# Patient Record
Sex: FEMALE | Race: BLACK OR AFRICAN AMERICAN | NOT HISPANIC OR LATINO | URBAN - METROPOLITAN AREA
[De-identification: names, ages, dates, MRNs, and addresses within clinical notes are randomized per-mention and may not be internally consistent; named-entity substitution may affect disease eponyms.]

---

## 2017-09-15 ENCOUNTER — EMERGENCY (EMERGENCY)
Facility: HOSPITAL | Age: 27
LOS: 0 days | Discharge: HOME | End: 2017-09-15
Attending: EMERGENCY MEDICINE
Payer: SELF-PAY

## 2017-09-15 VITALS
DIASTOLIC BLOOD PRESSURE: 72 MMHG | RESPIRATION RATE: 16 BRPM | HEART RATE: 92 BPM | WEIGHT: 154.98 LBS | SYSTOLIC BLOOD PRESSURE: 125 MMHG | OXYGEN SATURATION: 100 % | HEIGHT: 62 IN | TEMPERATURE: 100 F

## 2017-09-15 DIAGNOSIS — L02.91 CUTANEOUS ABSCESS, UNSPECIFIED: ICD-10-CM

## 2017-09-15 DIAGNOSIS — N75.0 CYST OF BARTHOLIN'S GLAND: ICD-10-CM

## 2017-09-15 PROCEDURE — 99283 EMERGENCY DEPT VISIT LOW MDM: CPT | Mod: 25

## 2017-09-15 PROCEDURE — 56420 I&D BARTHOLINS GLAND ABSCESS: CPT

## 2017-09-15 RX ORDER — ACETAMINOPHEN 500 MG
650 TABLET ORAL ONCE
Qty: 0 | Refills: 0 | Status: COMPLETED | OUTPATIENT
Start: 2017-09-15 | End: 2017-09-15

## 2017-09-15 RX ADMIN — Medication 300 MILLIGRAM(S): at 14:29

## 2017-09-15 RX ADMIN — Medication 650 MILLIGRAM(S): at 14:36

## 2017-09-15 NOTE — ED PROVIDER NOTE - OBJECTIVE STATEMENT
27 years old female walked in c/o painful lump to her vagina 2 to 3 days. Pt sts she has a hx of cyst to the vagina. Pt denies vaginal spotting or discharge or lesion. Pt sts she is not pregnant.

## 2019-10-09 NOTE — ED PROCEDURE NOTE - CPROC ED FINDINGS1
Detail Level: Simple Consent: The patient's consent was obtained including but not limited to risks of crusting, scabbing, blistering, scarring, darker or lighter pigmentary change, recurrence, incomplete removal and infection. Post-Care Instructions: I reviewed with the patient in detail post-care instructions. Patient is to wear sunprotection, and avoid picking at any of the treated lesions. Pt may apply Vaseline to crusted or scabbing areas. bloody fluid Duration Of Freeze Thaw-Cycle (Seconds): 5 Render Post-Care Instructions In Note?: no Number Of Freeze-Thaw Cycles: 1 freeze-thaw cycle

## 2020-07-09 ENCOUNTER — EMERGENCY (EMERGENCY)
Facility: HOSPITAL | Age: 30
LOS: 1 days | Discharge: HOME | End: 2020-07-09
Attending: EMERGENCY MEDICINE | Admitting: EMERGENCY MEDICINE
Payer: SELF-PAY

## 2020-07-09 VITALS
DIASTOLIC BLOOD PRESSURE: 83 MMHG | TEMPERATURE: 103 F | SYSTOLIC BLOOD PRESSURE: 142 MMHG | HEIGHT: 62 IN | OXYGEN SATURATION: 98 % | HEART RATE: 100 BPM | WEIGHT: 166.89 LBS | RESPIRATION RATE: 18 BRPM

## 2020-07-09 PROCEDURE — 99282 EMERGENCY DEPT VISIT SF MDM: CPT

## 2020-07-09 PROCEDURE — U0003: CPT

## 2020-07-09 PROCEDURE — 99283 EMERGENCY DEPT VISIT LOW MDM: CPT

## 2020-07-09 NOTE — ED PROVIDER NOTE - PATIENT PORTAL LINK FT
You can access the FollowMyHealth Patient Portal offered by [facility] by registering at the following website: [email or web address]. By joining [facility]’s FollowMyHealth portal, you will also be able to view your health information using other applications (apps) compatible with our system.

## 2020-07-09 NOTE — ED PROVIDER NOTE - CLINICAL SUMMARY MEDICAL DECISION MAKING FREE TEXT BOX
Pt is a 29 yo female with fever possible covid exposure looks well clinically no resp distress advised fluids rest Tylenol self quarantine  covid swab sent

## 2020-07-09 NOTE — ED PROVIDER NOTE - ATTENDING CONTRIBUTION TO CARE
29 yo F p/w fever x 1d, pts sister works in SNF and also has URI sx. No other known covid exposure. No cp/sob./palp. no weakness / dizziness. no neck / back pain. no abd pain. no n/v/d. No agg/allev factors. No other inj or co.,  exam: MM MOist. neck supple. non-toxic, well appearing. nl resp effort. no acc muscle use. no w/r/r./ lungs clear. abd soft NT. no other acute findings.  No hypoxia noted/  dw pt re covid risk, need for self iso and to return with any changes or concerns.,

## 2020-07-09 NOTE — ED PROVIDER NOTE - OBJECTIVE STATEMENT
Pt is a 31 yo female with no pmhx c/o of fever x1 day today took Tylenol pta. Pt denies any nvd cough sob abdominal pain dysuria. 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 Pt sister works at nursing home and has had covid exposures.

## 2020-07-09 NOTE — ED ADULT NURSE NOTE - OBJECTIVE STATEMENT
I got a fever today and took tylenol prior to arrivial. Pt received sitting on chair in NAD. Pt AOx3 C/o fever Neuro WNL. PERRLA. Lungs CTA, RR even unlabored. Ab soft non tender, + bowel sounds x 4quads. Denies Nausea, Vomiting, Diarrhea cough chest pain or SOB Skin warm, dry, color appropriate for age and race.

## 2020-07-09 NOTE — ED ADULT NURSE NOTE - NSIMPLEMENTINTERV_GEN_ALL_ED
Implemented All Universal Safety Interventions:  [place] to call system. Call bell, personal items and telephone within reach. Instruct patient to call for assistance. Room bathroom lighting operational. Non-slip footwear when patient is off stretcher. Physically safe environment: no spills, clutter or unnecessary equipment. Stretcher in lowest position, wheels locked, appropriate side rails in place.

## 2020-07-09 NOTE — ED PROVIDER NOTE - NSFOLLOWUPINSTRUCTIONS_ED_ALL_ED_FT
You have been tested today for COVID 19.  Currently you do not meet criteria for inpatient admission.  You are being sent home at this time for home isolation.  It is currently recommended at this time that you isolate for the next 14 days unless further instructions are provided at a later date.  When home on home isolation please try to use your own bathroom and bedroom   Continue Tylenol per label instructions as needed for fever and body aches  DO NOT TAKE IBUPROFEN AT THIS TIME  Salt water rinse as needed for sore throat  Advance activity as tolerated  Please return to the ED for increased difficulty breathing or signs of respiratory distress  Your will be contacted with your results at a later time

## 2020-07-10 LAB — SARS-COV-2 RNA SPEC QL NAA+PROBE: DETECTED

## 2020-07-13 PROBLEM — N75.0 CYST OF BARTHOLIN'S GLAND: Chronic | Status: ACTIVE | Noted: 2017-09-15

## 2022-04-03 PROBLEM — Z00.00 ENCOUNTER FOR PREVENTIVE HEALTH EXAMINATION: Status: ACTIVE | Noted: 2022-04-03

## 2022-04-11 ENCOUNTER — APPOINTMENT (OUTPATIENT)
Dept: OBGYN | Facility: CLINIC | Age: 32
End: 2022-04-11
Payer: COMMERCIAL

## 2022-04-11 ENCOUNTER — LABORATORY RESULT (OUTPATIENT)
Age: 32
End: 2022-04-11

## 2022-04-11 VITALS
BODY MASS INDEX: 26.5 KG/M2 | HEIGHT: 62 IN | SYSTOLIC BLOOD PRESSURE: 138 MMHG | DIASTOLIC BLOOD PRESSURE: 90 MMHG | WEIGHT: 144 LBS

## 2022-04-11 DIAGNOSIS — Z11.3 ENCOUNTER FOR SCREENING FOR INFECTIONS WITH A PREDOMINANTLY SEXUAL MODE OF TRANSMISSION: ICD-10-CM

## 2022-04-11 DIAGNOSIS — Z87.898 PERSONAL HISTORY OF OTHER SPECIFIED CONDITIONS: ICD-10-CM

## 2022-04-11 DIAGNOSIS — Z78.9 OTHER SPECIFIED HEALTH STATUS: ICD-10-CM

## 2022-04-11 LAB
HBV SURFACE AG SER QL: NONREACTIVE
HIV1+2 AB SPEC QL IA.RAPID: NONREACTIVE

## 2022-04-11 PROCEDURE — 76817 TRANSVAGINAL US OBSTETRIC: CPT

## 2022-04-11 PROCEDURE — 99385 PREV VISIT NEW AGE 18-39: CPT

## 2022-04-11 NOTE — PROCEDURE
[Transvaginal OB Sonogram] : Transvaginal OB Sonogram [Transabdominal OB Sonogram] : Transabdominal OB Sonogram [Intrauterine Pregnancy] : intrauterine pregnancy [Yolk Sac] : yolk sac present [Fetal Heart] : fetal heart present [CRL: ___ (mm)] : CRL - [unfilled]Umm [Current GA by Sonogram: ___ (wks)] : Current GA by Sonogram: [unfilled]Uwks [___ day(s)] : [unfilled] days [Fibroid Uterus] : fibroid uterus [FreeTextEntry3] : 5.21cm x6.54cm anterior fibroid noted\par small 3.56cm RONNELL fibroid noted

## 2022-04-11 NOTE — PHYSICAL EXAM
[Appropriately responsive] : appropriately responsive [Alert] : alert [No Acute Distress] : no acute distress [Soft] : soft [Non-tender] : non-tender [Non-distended] : non-distended [Oriented x3] : oriented x3 [FreeTextEntry7] : 20 week sized, fibroids palpated [Examination Of The Breasts] : a normal appearance [No Masses] : no breast masses were palpable [Labia Majora] : normal [Labia Minora] : normal [Normal] : normal [Enlarged ___ wks] : enlarged [unfilled] ~Uweeks [Uterine Adnexae] : normal

## 2022-04-11 NOTE — PLAN
[FreeTextEntry1] : 31 y/o  LMP  presents for annual exam \par \par #HCM\par -f/u pap/hpv\par -f/u STI testing\par \par #amenorrhea\par -undesired pregnancy \par -will scheduled for TOP consult

## 2022-04-11 NOTE — HISTORY OF PRESENT ILLNESS
[Patient reported PAP Smear was normal] : Patient reported PAP Smear was normal [Normal Amount/Duration] :  normal amount and duration [Regular Cycle Intervals] : periods have been regular [Currently Active] : currently active [Men] : men [Vaginal] : vaginal [No] : No [Condoms] : Condoms [Patient would like to be screened for STIs] : Patient would like to be screened for STIs [PapSmeardate] : 2017 [FreeTextEntry1] : 2/19/22

## 2022-04-12 LAB
ABO + RH PNL BLD: NORMAL
BASOPHILS # BLD AUTO: 0.02 K/UL
BASOPHILS NFR BLD AUTO: 0.3 %
BLD GP AB SCN SERPL QL: NORMAL
C TRACH RRNA SPEC QL NAA+PROBE: NOT DETECTED
EOSINOPHIL # BLD AUTO: 0.03 K/UL
EOSINOPHIL NFR BLD AUTO: 0.5 %
HCT VFR BLD CALC: 27.1 %
HCV RNA SERPL NAA+PROBE-LOG IU: NOT DETECTED LOGIU/ML
HEPC RNA INTERP: NOT DETECTED
HGB BLD-MCNC: 6.7 G/DL
HPV HIGH+LOW RISK DNA PNL CVX: NOT DETECTED
IMM GRANULOCYTES NFR BLD AUTO: 0.2 %
LYMPHOCYTES # BLD AUTO: 2.11 K/UL
LYMPHOCYTES NFR BLD AUTO: 35.7 %
MAN DIFF?: NORMAL
MCHC RBC-ENTMCNC: 15.7 PG
MCHC RBC-ENTMCNC: 24.7 GM/DL
MCV RBC AUTO: 63.3 FL
MONOCYTES # BLD AUTO: 0.54 K/UL
MONOCYTES NFR BLD AUTO: 9.1 %
N GONORRHOEA RRNA SPEC QL NAA+PROBE: NOT DETECTED
NEUTROPHILS # BLD AUTO: 3.2 K/UL
NEUTROPHILS NFR BLD AUTO: 54.2 %
PLATELET # BLD AUTO: 273 K/UL
RBC # BLD: 4.28 M/UL
RBC # FLD: 23.9 %
SOURCE AMPLIFICATION: NORMAL
T PALLIDUM AB SER QL IA: NEGATIVE
WBC # FLD AUTO: 5.91 K/UL

## 2022-04-13 ENCOUNTER — APPOINTMENT (OUTPATIENT)
Dept: OBGYN | Facility: CLINIC | Age: 32
End: 2022-04-13

## 2022-04-14 ENCOUNTER — APPOINTMENT (OUTPATIENT)
Dept: OBGYN | Facility: CLINIC | Age: 32
End: 2022-04-14

## 2022-04-15 LAB — CYTOLOGY CVX/VAG DOC THIN PREP: NORMAL

## 2022-04-21 ENCOUNTER — TRANSCRIPTION ENCOUNTER (OUTPATIENT)
Age: 32
End: 2022-04-21

## 2022-04-21 ENCOUNTER — APPOINTMENT (OUTPATIENT)
Dept: OBGYN | Facility: CLINIC | Age: 32
End: 2022-04-21
Payer: COMMERCIAL

## 2022-04-21 PROCEDURE — 99214 OFFICE O/P EST MOD 30 MIN: CPT | Mod: 95

## 2022-04-21 NOTE — HISTORY OF PRESENT ILLNESS
[FreeTextEntry1] : This visit was provided via Telehealth using real-time 2-way audio visual technology. The patient THEA HALL was located at home [address] [place] [address] at the time of the visit. The provider Violet Mckenzie was located at the medical office located in [place], [place] at the time of the visit. The patient THEA HALL and provider participated in the Telehealth encounter. Verbal consent for Telehealth services was given on 2022 by the patient THEA HALL.\par \par \par 33 y/o  @8w5d (LMP 22) presents for consultation for termination of pregnancy due to unplanned, undesired pregnancy.\par \par Pt was not using any birth control when this happened. \par \par [facility] Pt was seen by Dr. Jett and cleared from hematology standpoint. 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

## 2022-04-25 ENCOUNTER — OUTPATIENT (OUTPATIENT)
Dept: OUTPATIENT SERVICES | Facility: HOSPITAL | Age: 32
LOS: 1 days | End: 2022-04-25
Payer: COMMERCIAL

## 2022-04-25 ENCOUNTER — TRANSCRIPTION ENCOUNTER (OUTPATIENT)
Age: 32
End: 2022-04-25

## 2022-04-25 VITALS
SYSTOLIC BLOOD PRESSURE: 117 MMHG | OXYGEN SATURATION: 100 % | TEMPERATURE: 99 F | RESPIRATION RATE: 18 BRPM | WEIGHT: 147.93 LBS | HEART RATE: 93 BPM | HEIGHT: 62 IN | DIASTOLIC BLOOD PRESSURE: 72 MMHG

## 2022-04-25 DIAGNOSIS — Z34.90 ENCOUNTER FOR SUPERVISION OF NORMAL PREGNANCY, UNSPECIFIED, UNSPECIFIED TRIMESTER: ICD-10-CM

## 2022-04-25 DIAGNOSIS — Z01.818 ENCOUNTER FOR OTHER PREPROCEDURAL EXAMINATION: ICD-10-CM

## 2022-04-25 DIAGNOSIS — Z33.2 ENCOUNTER FOR ELECTIVE TERMINATION OF PREGNANCY: ICD-10-CM

## 2022-04-25 DIAGNOSIS — Z98.890 OTHER SPECIFIED POSTPROCEDURAL STATES: Chronic | ICD-10-CM

## 2022-04-25 DIAGNOSIS — Z11.52 ENCOUNTER FOR SCREENING FOR COVID-19: ICD-10-CM

## 2022-04-25 LAB
BLD GP AB SCN SERPL QL: NEGATIVE — SIGNIFICANT CHANGE UP
HCT VFR BLD CALC: 28.7 % — LOW (ref 34.5–45)
HGB BLD-MCNC: 7.2 G/DL — LOW (ref 11.5–15.5)
MCHC RBC-ENTMCNC: 17.4 PG — LOW (ref 27–34)
MCHC RBC-ENTMCNC: 25.1 GM/DL — LOW (ref 32–36)
MCV RBC AUTO: 69.3 FL — LOW (ref 80–100)
NRBC # BLD: 0 /100 WBCS — SIGNIFICANT CHANGE UP (ref 0–0)
PLATELET # BLD AUTO: 563 K/UL — HIGH (ref 150–400)
RBC # BLD: 4.14 M/UL — SIGNIFICANT CHANGE UP (ref 3.8–5.2)
RBC # FLD: 29.3 % — HIGH (ref 10.3–14.5)
RH IG SCN BLD-IMP: POSITIVE — SIGNIFICANT CHANGE UP
SARS-COV-2 RNA SPEC QL NAA+PROBE: SIGNIFICANT CHANGE UP
WBC # BLD: 7.22 K/UL — SIGNIFICANT CHANGE UP (ref 3.8–10.5)
WBC # FLD AUTO: 7.22 K/UL — SIGNIFICANT CHANGE UP (ref 3.8–10.5)

## 2022-04-25 PROCEDURE — 86901 BLOOD TYPING SEROLOGIC RH(D): CPT

## 2022-04-25 PROCEDURE — 86850 RBC ANTIBODY SCREEN: CPT

## 2022-04-25 PROCEDURE — G0463: CPT

## 2022-04-25 PROCEDURE — 85027 COMPLETE CBC AUTOMATED: CPT

## 2022-04-25 PROCEDURE — 86900 BLOOD TYPING SEROLOGIC ABO: CPT

## 2022-04-25 PROCEDURE — C9803: CPT

## 2022-04-25 PROCEDURE — U0005: CPT

## 2022-04-25 PROCEDURE — U0003: CPT

## 2022-04-25 RX ORDER — SODIUM CHLORIDE 9 MG/ML
3 INJECTION INTRAMUSCULAR; INTRAVENOUS; SUBCUTANEOUS EVERY 8 HOURS
Refills: 0 | Status: DISCONTINUED | OUTPATIENT
Start: 2022-04-26 | End: 2022-05-10

## 2022-04-25 RX ORDER — SODIUM CHLORIDE 9 MG/ML
1000 INJECTION, SOLUTION INTRAVENOUS
Refills: 0 | Status: DISCONTINUED | OUTPATIENT
Start: 2022-04-26 | End: 2022-05-10

## 2022-04-25 NOTE — H&P PST ADULT - FALL HARM RISK - UNIVERSAL INTERVENTIONS
Bed in lowest position, wheels locked, appropriate side rails in place/Call bell, personal items and telephone in reach/Instruct patient to call for assistance before getting out of bed or chair/Non-slip footwear when patient is out of bed/[place] to call system/Physically safe environment - no spills, clutter or unnecessary equipment/Purposeful Proactive Rounding/Room/bathroom lighting operational, light cord in reach

## 2022-04-25 NOTE — H&P PST ADULT - HISTORY OF PRESENT ILLNESS
This is a 33 y/o female PMH iron deficiency anemia, S/P ferritin a week ago,  3 para 1, S/P D+C for IUP X 1, now approximately 9 weeks gestation.  Presents today for D+C for IUP.    COVID+ 3/2021, was asymptomatic.  COVID swab 22 at [facility] This is a 31 y/o female PMH iron deficiency anemia, S/P ferritin infusion a week ago,  3 para 1, S/P D+C for IUP X 1, now approximately 9 weeks gestation.  Presents today for D+C for IUP.    COVID+ 3/2021, was asymptomatic.  COVID swab 22 at [facility]

## 2022-04-26 ENCOUNTER — OUTPATIENT (OUTPATIENT)
Dept: OUTPATIENT SERVICES | Facility: HOSPITAL | Age: 32
LOS: 1 days | End: 2022-04-26
Payer: COMMERCIAL

## 2022-04-26 ENCOUNTER — TRANSCRIPTION ENCOUNTER (OUTPATIENT)
Age: 32
End: 2022-04-26

## 2022-04-26 ENCOUNTER — APPOINTMENT (OUTPATIENT)
Dept: OBGYN | Facility: CLINIC | Age: 32
End: 2022-04-26

## 2022-04-26 ENCOUNTER — RESULT REVIEW (OUTPATIENT)
Age: 32
End: 2022-04-26

## 2022-04-26 VITALS
HEART RATE: 63 BPM | SYSTOLIC BLOOD PRESSURE: 101 MMHG | RESPIRATION RATE: 17 BRPM | TEMPERATURE: 97 F | DIASTOLIC BLOOD PRESSURE: 60 MMHG | OXYGEN SATURATION: 100 %

## 2022-04-26 VITALS
RESPIRATION RATE: 16 BRPM | HEIGHT: 62 IN | OXYGEN SATURATION: 100 % | HEART RATE: 63 BPM | WEIGHT: 147.93 LBS | SYSTOLIC BLOOD PRESSURE: 103 MMHG | TEMPERATURE: 97 F | DIASTOLIC BLOOD PRESSURE: 66 MMHG

## 2022-04-26 DIAGNOSIS — Z98.890 OTHER SPECIFIED POSTPROCEDURAL STATES: Chronic | ICD-10-CM

## 2022-04-26 DIAGNOSIS — Z33.2 ENCOUNTER FOR ELECTIVE TERMINATION OF PREGNANCY: ICD-10-CM

## 2022-04-26 LAB
HCT VFR BLD CALC: 28.7 % — LOW (ref 34.5–45)
HGB BLD-MCNC: 7.5 G/DL — LOW (ref 11.5–15.5)
MCHC RBC-ENTMCNC: 17.4 PG — LOW (ref 27–34)
MCHC RBC-ENTMCNC: 26.1 GM/DL — LOW (ref 32–36)
MCV RBC AUTO: 66.4 FL — LOW (ref 80–100)
NRBC # BLD: 0 /100 WBCS — SIGNIFICANT CHANGE UP (ref 0–0)
PLATELET # BLD AUTO: 547 K/UL — HIGH (ref 150–400)
RBC # BLD: 4.32 M/UL — SIGNIFICANT CHANGE UP (ref 3.8–5.2)
RBC # FLD: 29.1 % — HIGH (ref 10.3–14.5)
RH IG SCN BLD-IMP: POSITIVE — SIGNIFICANT CHANGE UP
WBC # BLD: 7.3 K/UL — SIGNIFICANT CHANGE UP (ref 3.8–10.5)
WBC # FLD AUTO: 7.3 K/UL — SIGNIFICANT CHANGE UP (ref 3.8–10.5)

## 2022-04-26 PROCEDURE — 88305 TISSUE EXAM BY PATHOLOGIST: CPT | Mod: 26

## 2022-04-26 PROCEDURE — 76998 US GUIDE INTRAOP: CPT | Mod: 26

## 2022-04-26 PROCEDURE — 36415 COLL VENOUS BLD VENIPUNCTURE: CPT

## 2022-04-26 PROCEDURE — 85027 COMPLETE CBC AUTOMATED: CPT

## 2022-04-26 PROCEDURE — 59812 TREATMENT OF MISCARRIAGE: CPT

## 2022-04-26 PROCEDURE — 88305 TISSUE EXAM BY PATHOLOGIST: CPT

## 2022-04-26 PROCEDURE — 59840 INDUCED ABORTION D&C: CPT

## 2022-04-26 RX ORDER — ONDANSETRON 8 MG/1
4 TABLET, FILM COATED ORAL ONCE
Refills: 0 | Status: DISCONTINUED | OUTPATIENT
Start: 2022-04-26 | End: 2022-05-10

## 2022-04-26 RX ORDER — OXYCODONE HYDROCHLORIDE 5 MG/1
5 TABLET ORAL ONCE
Refills: 0 | Status: DISCONTINUED | OUTPATIENT
Start: 2022-04-26 | End: 2022-04-26

## 2022-04-26 RX ORDER — FENTANYL CITRATE 50 UG/ML
25 INJECTION INTRAVENOUS
Refills: 0 | Status: DISCONTINUED | OUTPATIENT
Start: 2022-04-26 | End: 2022-04-26

## 2022-04-26 RX ORDER — LIDOCAINE HCL 20 MG/ML
0.2 VIAL (ML) INJECTION ONCE
Refills: 0 | Status: COMPLETED | OUTPATIENT
Start: 2022-04-26 | End: 2022-04-26

## 2022-04-26 RX ADMIN — SODIUM CHLORIDE 100 MILLILITER(S): 9 INJECTION, SOLUTION INTRAVENOUS at 13:25

## 2022-04-26 NOTE — ASU DISCHARGE PLAN (ADULT/PEDIATRIC) - NURSING INSTRUCTIONS
Next dose of Tylenol will be on or after ______8:30 PM_____ ,today/tonight and every 6 hours afterwards as needed for pain management, do not take any Tylenol containing products until this time. Your first dose of Tylenol was given at ___2:30___PM_____. Do not exceed more than 4000mg of Tylenol in one 24 hour setting. If no contraindications, you may alternate with Ibuprofen  3 hours after dose of Tylenol. Ibuprofen can be taken every 6 hours, Last dose received at 2:53 PM.

## 2022-04-26 NOTE — BRIEF OPERATIVE NOTE - OPERATION/FINDINGS
Anteverted uterus, 9 week sized  Gestation sac appropriate for gestational age  Thin endometrial stripe noted at the end of the procedure Anteverted uterus, 9 week sized  D&C performed under direct ultrasound guidance  Gestation sac appropriate for gestational age  Thin endometrial stripe noted at the end of the procedure

## 2022-04-26 NOTE — ASU DISCHARGE PLAN (ADULT/PEDIATRIC) - CARE PROVIDER_API CALL
Violet Mckenzie)  OBSGYN  General  [address], [address]  [place] [address]  Phone: [phone]  Fax: [phone]  Follow Up Time: 2 weeks

## 2022-04-26 NOTE — ASU DISCHARGE PLAN (ADULT/PEDIATRIC) - CALL YOUR DOCTOR IF YOU HAVE ANY OF THE FOLLOWING:
Bleeding that does not stop/Pain not relieved by Medications/Fever greater than (need to indicate Fahrenheit or Celsius) Bleeding that does not stop/Swelling that gets worse/Pain not relieved by Medications/Fever greater than (need to indicate Fahrenheit or Celsius)/Nausea and vomiting that does not stop/Unable to urinate/Inability to tolerate liquids or foods

## 2022-04-26 NOTE — PRE-ANESTHESIA EVALUATION ADULT - HEART RATE (BEATS/MIN)
63 Hatchet Flap Text: The defect edges were debeveled with a #15 scalpel blade.  Given the location of the defect, shape of the defect and the proximity to free margins a hatchet flap was deemed most appropriate.  Using a sterile surgical marker, an appropriate hatchet flap was drawn incorporating the defect and placing the expected incisions within the relaxed skin tension lines where possible.    The area thus outlined was incised deep to adipose tissue with a #15 scalpel blade.  The skin margins were undermined to an appropriate distance in all directions utilizing iris scissors.

## 2022-04-26 NOTE — ASU DISCHARGE PLAN (ADULT/PEDIATRIC) - NS MD DC FALL RISK RISK
For information on Fall & Injury Prevention, visit: [email or web address] OR  [email or web address] OR  https://www.cdc.gov/steadi/patient.html

## 2022-04-26 NOTE — ASU PATIENT PROFILE, ADULT - FALL HARM RISK - UNIVERSAL INTERVENTIONS
Bed in lowest position, wheels locked, appropriate side rails in place/Call bell, personal items and telephone in reach/Instruct patient to call for assistance before getting out of bed or chair/Non-slip footwear when patient is out of bed/[place] to call system/Physically safe environment - no spills, clutter or unnecessary equipment/Purposeful Proactive Rounding/Room/bathroom lighting operational, light cord in reach

## 2022-04-26 NOTE — ASU PATIENT PROFILE, ADULT - NS PRO LAST MENSTRUAL
Silver Nitrate Text: The wound bed was treated with silver nitrate after the biopsy was performed. 2/19/09

## 2022-04-26 NOTE — BRIEF OPERATIVE NOTE - NSICDXBRIEFPROCEDURE_GEN_ALL_CORE_FT
PROCEDURES:  Dilation and curettage, uterus, using suction, with US guidance 26-Apr-2022 15:27:29  Cruz James

## 2022-04-28 ENCOUNTER — NON-APPOINTMENT (OUTPATIENT)
Age: 32
End: 2022-04-28

## 2022-05-02 LAB — SURGICAL PATHOLOGY STUDY: SIGNIFICANT CHANGE UP

## 2022-05-12 PROBLEM — Z86.39 PERSONAL HISTORY OF OTHER ENDOCRINE, NUTRITIONAL AND METABOLIC DISEASE: Chronic | Status: ACTIVE | Noted: 2022-04-25

## 2022-05-25 ENCOUNTER — APPOINTMENT (OUTPATIENT)
Dept: OBGYN | Facility: CLINIC | Age: 32
End: 2022-05-25
Payer: COMMERCIAL

## 2022-05-25 PROCEDURE — 99213 OFFICE O/P EST LOW 20 MIN: CPT | Mod: 95
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
## 2022-05-25 NOTE — HISTORY OF PRESENT ILLNESS
[FreeTextEntry1] : This visit was provided via Telehealth using real-time 2-way audio visual technology. The patient THEA HALL was located at home [address] [place] [id number] at the time of the visit. The provider Violet Mckenzie was located at the medical office located in [place], [place] at the time of the visit. The patient THEA HALL and provider participated in the Telehealth encounter. Verbal consent for Telehealth services was given on May 25, 2022 by the patient THEA HALL.\par \par 31 y/o  s/p D&C @9w6d on 22 due to unplanned, undesired pregnancy presents for follow up. Minimal vaginal bleeding and cramping. Tolerating po, voiding, ambulating.

## 2023-12-15 ENCOUNTER — LABORATORY RESULT (OUTPATIENT)
Age: 33
End: 2023-12-15

## 2023-12-15 DIAGNOSIS — Z01.818 ENCOUNTER FOR OTHER PREPROCEDURAL EXAMINATION: ICD-10-CM

## 2023-12-16 ENCOUNTER — APPOINTMENT (OUTPATIENT)
Dept: OBGYN | Facility: CLINIC | Age: 33
End: 2023-12-16
Payer: MEDICAID

## 2023-12-16 VITALS
BODY MASS INDEX: 29.44 KG/M2 | WEIGHT: 160 LBS | DIASTOLIC BLOOD PRESSURE: 75 MMHG | HEIGHT: 62 IN | SYSTOLIC BLOOD PRESSURE: 113 MMHG

## 2023-12-16 DIAGNOSIS — Z82.49 FAMILY HISTORY OF ISCHEMIC HEART DISEASE AND OTHER DISEASES OF THE CIRCULATORY SYSTEM: ICD-10-CM

## 2023-12-16 DIAGNOSIS — Z86.2 PERSONAL HISTORY OF DISEASES OF THE BLOOD AND BLOOD-FORMING ORGANS AND CERTAIN DISORDERS INVOLVING THE IMMUNE MECHANISM: ICD-10-CM

## 2023-12-16 PROCEDURE — 99214 OFFICE O/P EST MOD 30 MIN: CPT | Mod: 25

## 2023-12-16 PROCEDURE — 76815 OB US LIMITED FETUS(S): CPT

## 2023-12-16 NOTE — PLAN
[FreeTextEntry1] : 32 y/o  @9wks (LMP 10/14/23)  requesting D&C due to unplanned, undesired pregnancy.  1. Dilation and Curettage - All available medical records reviewed - All consents signed today, all questions/concerns addressed - pt is not a candidate for medical  - reviewed patients responsibility to contact insurance company for coverage confirmation - Patient offered pamphlet for support services: patient accepted - Genetics: n/a  2. Surgery scheduling - Patient to be precertified for D+C - D+C scheduled for  main OR. CBC after consult came back and H/H is 7.1. Will have patient obtain heme clearance and will schedule patient for main OR - PSTs, COVID testing scheduled and reviewed  3. ID/Neuro/cervical prep - GC/CT obtained - miso 400mcg 3 hours prior to surgery - pt declines HIV/RPR/hepatitis testing - doxycycline 200 mg IV in OR - Reviewed Ibuprofen 600 mg po q 6 prn  4. Labs/Blood type - CBC, T+S, coags at PSTs - Rhogam pending results  5. Contraception - Patient counseled on all contraceptive options - Patient desires post  contraception -considering IUD vs OCPs, will let us know day of surgery  6. Post-op - Post-operative follow-up phone call virtual visit to be scheduled in 2 weeks - pre- and Post-operative instruction sheet given, reviewed bleeding and infection precautions - Provided 24 hour contact phone number - All questions/concerns of patient addressed to their satisfaction

## 2023-12-16 NOTE — COUNSELING
[TextEntry] : Options for the pregnancy were discussed with the patient, including continuation of pregnancy, and dilation and curettage (D&C) in the office under local anesthesia or in the operating room under sedation. Given the pregnancy is undesired, the patient would prefer not to continue the pregnancy. They do not desire to continue the pregnancy and are requesting a D&C in the operating room under sedation.  They do not desire a medical .  Risks of D&C includin.	Infection: Patient was counseled on risk of infection and the use of prophylactic antibiotics, signs/symptoms of pre- and post-operative infection were reviewed.  2.	Hemorrhage: Patient was counseled on the risk of hemorrhage, possibly requiring blood (and/or blood products) transfusion, management including use of uterotonic medications, possible use of uterine balloon tamponade, possible use of interventional radiology uterine artery embolization, and possible hysterectomy.  If patient has had prior surgeries, their risk of injury to abdominopelvic organs associated with hysterectomy increases. 3.	Trauma: Patient was counseled on the risk of trauma to vagina, cervix or uterus, possibly requiring laparoscopy, laparotomy, abdomino-pelvic organ repair and/or removal of affected organ(s).  This includes possible hysterectomy and was reviewed in detail.  The patient was counseled on the small risk of uterine perforation and the risk of injury to the vagina, cervix, uterus, rectum, bowel (small and large intestine), bladder, ureters, pelvic nerves and blood vessels.    The patient was also counseled on the small risk of the need for further surgery and of the risk, as with any surgical procedure, of death.  The risk of harm to subsequent pregnancies or the ability to carry a subsequent fetus to term, and adverse psychological effects was discussed.    Need for cervical ripening with misoprostol was also discussed; the accompanying risks of infection, bleeding were discussed.  They understand these risks and agree to above.  The patient does have  underlying medical conditions that would increase their risks associated with the procedure. These conditions are hx of anemia, fibroids and 1 prior c/s .The additional risks of the procedure are hemorrhage and trauma. The patient was also counseled that there is a chance that we may be unable to perform the procedure due to her anatomy. In that case we would have to perform the procedure laparoscopically.  The patient also understands it is their responsibility to bring to the attention of their physician any unusual symptoms following the procedure and to report to follow-up examinations.    They are sure of their decision and deny any coercion from family, friends or healthcare providers. The patient had the opportunity to ask questions and all questions were answered.

## 2023-12-16 NOTE — HISTORY OF PRESENT ILLNESS
[FreeTextEntry1] : 32 y/o  @9wks (LMP 10/14/23) presents for consultation for termination of pregnancy due to unplanned, undesired pregnancy.   Partner is aware and supportive of her decision. Pt was not using any birth control when this happened. Has not followed up with us since her last procedure, which was complicated by anemia. At the time of this consult, the cbc was not back yet for this patient.  POB:  2007: [facility]: C/S at 42 wks, macrosomia  2022: D&C @9wks, main OR due to anemia  PGYN: fibroids

## 2023-12-16 NOTE — PROCEDURE
[Transvaginal OB Sonogram] : Transvaginal OB Sonogram [Transabdominal OB Sonogram] : Transabdominal OB Sonogram [Intrauterine Pregnancy] : intrauterine pregnancy [Yolk Sac] : yolk sac present [Fetal Heart] : fetal heart present [CRL: ___ (mm)] : CRL - [unfilled]Umm [Current GA by Sonogram: ___ (wks)] : Current GA by Sonogram: [unfilled]Uwks [___ day(s)] : [unfilled] days [FreeTextEntry1] : Large posterior fibroid 6.2x6.92cm

## 2023-12-18 ENCOUNTER — OUTPATIENT (OUTPATIENT)
Dept: OUTPATIENT SERVICES | Facility: HOSPITAL | Age: 33
LOS: 1 days | End: 2023-12-18
Payer: MEDICAID

## 2023-12-18 VITALS
WEIGHT: 158.07 LBS | TEMPERATURE: 98 F | RESPIRATION RATE: 14 BRPM | DIASTOLIC BLOOD PRESSURE: 72 MMHG | OXYGEN SATURATION: 100 % | HEIGHT: 63 IN | HEART RATE: 81 BPM | SYSTOLIC BLOOD PRESSURE: 114 MMHG

## 2023-12-18 DIAGNOSIS — Z98.891 HISTORY OF UTERINE SCAR FROM PREVIOUS SURGERY: Chronic | ICD-10-CM

## 2023-12-18 DIAGNOSIS — Z33.2 ENCOUNTER FOR ELECTIVE TERMINATION OF PREGNANCY: ICD-10-CM

## 2023-12-18 DIAGNOSIS — Z01.818 ENCOUNTER FOR OTHER PREPROCEDURAL EXAMINATION: ICD-10-CM

## 2023-12-18 DIAGNOSIS — D21.9 BENIGN NEOPLASM OF CONNECTIVE AND OTHER SOFT TISSUE, UNSPECIFIED: Chronic | ICD-10-CM

## 2023-12-18 DIAGNOSIS — Z98.890 OTHER SPECIFIED POSTPROCEDURAL STATES: Chronic | ICD-10-CM

## 2023-12-18 DIAGNOSIS — D64.9 ANEMIA, UNSPECIFIED: Chronic | ICD-10-CM

## 2023-12-18 LAB
ABO + RH PNL BLD: NORMAL
BASOPHILS # BLD AUTO: 0.02 K/UL
BASOPHILS NFR BLD AUTO: 0.3 %
BLD GP AB SCN SERPL QL: NEGATIVE — SIGNIFICANT CHANGE UP
BLD GP AB SCN SERPL QL: NEGATIVE — SIGNIFICANT CHANGE UP
C TRACH RRNA SPEC QL NAA+PROBE: NOT DETECTED
EOSINOPHIL # BLD AUTO: 0.03 K/UL
EOSINOPHIL NFR BLD AUTO: 0.5 %
HCT VFR BLD CALC: 27.3 %
HCT VFR BLD CALC: 28.5 % — LOW (ref 34.5–45)
HCT VFR BLD CALC: 28.5 % — LOW (ref 34.5–45)
HGB BLD-MCNC: 7.1 G/DL
HGB BLD-MCNC: 7.3 G/DL — LOW (ref 11.5–15.5)
HGB BLD-MCNC: 7.3 G/DL — LOW (ref 11.5–15.5)
IMM GRANULOCYTES NFR BLD AUTO: 0.3 %
LYMPHOCYTES # BLD AUTO: 1.72 K/UL
LYMPHOCYTES NFR BLD AUTO: 27 %
MAN DIFF?: NORMAL
MCHC RBC-ENTMCNC: 16.2 PG — LOW (ref 27–34)
MCHC RBC-ENTMCNC: 16.2 PG — LOW (ref 27–34)
MCHC RBC-ENTMCNC: 16.4 PG
MCHC RBC-ENTMCNC: 25.6 GM/DL — LOW (ref 32–36)
MCHC RBC-ENTMCNC: 25.6 GM/DL — LOW (ref 32–36)
MCHC RBC-ENTMCNC: 26 GM/DL
MCV RBC AUTO: 62.9 FL
MCV RBC AUTO: 63.2 FL — LOW (ref 80–100)
MCV RBC AUTO: 63.2 FL — LOW (ref 80–100)
MONOCYTES # BLD AUTO: 0.72 K/UL
MONOCYTES NFR BLD AUTO: 11.3 %
N GONORRHOEA RRNA SPEC QL NAA+PROBE: NOT DETECTED
NEUTROPHILS # BLD AUTO: 3.85 K/UL
NEUTROPHILS NFR BLD AUTO: 60.6 %
NRBC # BLD: 0 /100 WBCS — SIGNIFICANT CHANGE UP (ref 0–0)
NRBC # BLD: 0 /100 WBCS — SIGNIFICANT CHANGE UP (ref 0–0)
PLATELET # BLD AUTO: 436 K/UL
PLATELET # BLD AUTO: 484 K/UL — HIGH (ref 150–400)
PLATELET # BLD AUTO: 484 K/UL — HIGH (ref 150–400)
RBC # BLD: 4.34 M/UL
RBC # BLD: 4.51 M/UL — SIGNIFICANT CHANGE UP (ref 3.8–5.2)
RBC # BLD: 4.51 M/UL — SIGNIFICANT CHANGE UP (ref 3.8–5.2)
RBC # FLD: 21.2 %
RBC # FLD: 21.3 % — HIGH (ref 10.3–14.5)
RBC # FLD: 21.3 % — HIGH (ref 10.3–14.5)
RH IG SCN BLD-IMP: POSITIVE — SIGNIFICANT CHANGE UP
RH IG SCN BLD-IMP: POSITIVE — SIGNIFICANT CHANGE UP
SOURCE AMPLIFICATION: NORMAL
WBC # BLD: 6.01 K/UL — SIGNIFICANT CHANGE UP (ref 3.8–10.5)
WBC # BLD: 6.01 K/UL — SIGNIFICANT CHANGE UP (ref 3.8–10.5)
WBC # FLD AUTO: 6.01 K/UL — SIGNIFICANT CHANGE UP (ref 3.8–10.5)
WBC # FLD AUTO: 6.01 K/UL — SIGNIFICANT CHANGE UP (ref 3.8–10.5)
WBC # FLD AUTO: 6.36 K/UL

## 2023-12-18 PROCEDURE — 86900 BLOOD TYPING SEROLOGIC ABO: CPT

## 2023-12-18 PROCEDURE — 85027 COMPLETE CBC AUTOMATED: CPT

## 2023-12-18 PROCEDURE — 86901 BLOOD TYPING SEROLOGIC RH(D): CPT

## 2023-12-18 PROCEDURE — G0463: CPT

## 2023-12-18 PROCEDURE — 86850 RBC ANTIBODY SCREEN: CPT

## 2023-12-18 NOTE — H&P PST ADULT - HISTORY OF PRESENT ILLNESS
33 year old female with PMH of Iron Deficiency Anemia (patient states she required iron infusions over the summer), , LMP 10/14/2023 @ 9w2d who presents for unplanned, undesired pregnancy. Patient endorses nausea and vomiting due to pregnant state. She denies  fever, chills, SOB, BRUCE, chest pain, palpitations, dizziness, or lightheadedness. She presents to PST today for evaluation prior to scheduled D&C w/Ultrasound guidance on 2023.

## 2023-12-18 NOTE — H&P PST ADULT - MUSCULOSKELETAL
negative ROM intact/no joint swelling/normal gait/strength 5/5 bilateral upper extremities/strength 5/5 bilateral lower extremities

## 2023-12-18 NOTE — H&P PST ADULT - PROBLEM SELECTOR PLAN 1
D&C, w/Ultrasound guidance  -cbc, coags, type and screen @ PST  -preop instructions provided  -ABO on admit

## 2023-12-18 NOTE — H&P PST ADULT - NSCAGESTDRUGEYEOP_GEN_A_CORE_SD
Patient's chart reviewed, please contact to schedule OA colonoscopy with .Patient Preference      Schedule Procedure:   Please Schedule Routine (next available or patient preference)  Procedure: Colonoscopy (76418) with Providers preference   Diagnosis: Colon Cancer Screening Z12.11  Is patient:    Diabetic? No   ANTIPLATELET / ANTICOAGULATION: MEDICATION:  None  Latex allergy: No  Sleep apnea: No}  Location: Patient Preference  Sedation: IV Anesthesia    Special Instructions:        Covid: Fully Vaccinated  Unknown   Immunocompromised:  No        no

## 2023-12-19 ENCOUNTER — APPOINTMENT (OUTPATIENT)
Dept: OBGYN | Facility: CLINIC | Age: 33
End: 2023-12-19

## 2023-12-21 PROBLEM — D21.9 BENIGN NEOPLASM OF CONNECTIVE AND OTHER SOFT TISSUE, UNSPECIFIED: Chronic | Status: ACTIVE | Noted: 2023-12-18

## 2023-12-21 PROBLEM — D64.9 ANEMIA, UNSPECIFIED: Chronic | Status: ACTIVE | Noted: 2023-12-18

## 2023-12-28 ENCOUNTER — TRANSCRIPTION ENCOUNTER (OUTPATIENT)
Age: 33
End: 2023-12-28

## 2023-12-28 ENCOUNTER — EMERGENCY (EMERGENCY)
Facility: HOSPITAL | Age: 33
LOS: 1 days | Discharge: HOME | End: 2023-12-28
Attending: EMERGENCY MEDICINE
Payer: MEDICAID

## 2023-12-28 VITALS
TEMPERATURE: 98 F | WEIGHT: 156.97 LBS | SYSTOLIC BLOOD PRESSURE: 133 MMHG | RESPIRATION RATE: 20 BRPM | HEIGHT: 63 IN | OXYGEN SATURATION: 99 % | HEART RATE: 124 BPM | DIASTOLIC BLOOD PRESSURE: 80 MMHG

## 2023-12-28 DIAGNOSIS — Z98.890 OTHER SPECIFIED POSTPROCEDURAL STATES: Chronic | ICD-10-CM

## 2023-12-28 DIAGNOSIS — Z98.891 HISTORY OF UTERINE SCAR FROM PREVIOUS SURGERY: Chronic | ICD-10-CM

## 2023-12-28 LAB
ALBUMIN SERPL ELPH-MCNC: 4.1 G/DL — SIGNIFICANT CHANGE UP (ref 3.3–5)
ALBUMIN SERPL ELPH-MCNC: 4.1 G/DL — SIGNIFICANT CHANGE UP (ref 3.3–5)
ALP SERPL-CCNC: 47 U/L — SIGNIFICANT CHANGE UP (ref 40–120)
ALP SERPL-CCNC: 47 U/L — SIGNIFICANT CHANGE UP (ref 40–120)
ALT FLD-CCNC: 6 U/L — LOW (ref 10–45)
ALT FLD-CCNC: 6 U/L — LOW (ref 10–45)
ANION GAP SERPL CALC-SCNC: 11 MMOL/L — SIGNIFICANT CHANGE UP (ref 5–17)
ANION GAP SERPL CALC-SCNC: 11 MMOL/L — SIGNIFICANT CHANGE UP (ref 5–17)
APPEARANCE UR: CLEAR — SIGNIFICANT CHANGE UP
APPEARANCE UR: CLEAR — SIGNIFICANT CHANGE UP
APTT BLD: 27.5 SEC — SIGNIFICANT CHANGE UP (ref 24.5–35.6)
APTT BLD: 27.5 SEC — SIGNIFICANT CHANGE UP (ref 24.5–35.6)
AST SERPL-CCNC: 11 U/L — SIGNIFICANT CHANGE UP (ref 10–40)
AST SERPL-CCNC: 11 U/L — SIGNIFICANT CHANGE UP (ref 10–40)
BASOPHILS # BLD AUTO: 0.08 K/UL — SIGNIFICANT CHANGE UP (ref 0–0.2)
BASOPHILS # BLD AUTO: 0.08 K/UL — SIGNIFICANT CHANGE UP (ref 0–0.2)
BASOPHILS NFR BLD AUTO: 1 % — SIGNIFICANT CHANGE UP (ref 0–2)
BASOPHILS NFR BLD AUTO: 1 % — SIGNIFICANT CHANGE UP (ref 0–2)
BILIRUB SERPL-MCNC: 0.6 MG/DL — SIGNIFICANT CHANGE UP (ref 0.2–1.2)
BILIRUB SERPL-MCNC: 0.6 MG/DL — SIGNIFICANT CHANGE UP (ref 0.2–1.2)
BILIRUB UR-MCNC: NEGATIVE — SIGNIFICANT CHANGE UP
BILIRUB UR-MCNC: NEGATIVE — SIGNIFICANT CHANGE UP
BLD GP AB SCN SERPL QL: NEGATIVE — SIGNIFICANT CHANGE UP
BLD GP AB SCN SERPL QL: NEGATIVE — SIGNIFICANT CHANGE UP
BUN SERPL-MCNC: 5 MG/DL — LOW (ref 7–23)
BUN SERPL-MCNC: 5 MG/DL — LOW (ref 7–23)
CALCIUM SERPL-MCNC: 9.4 MG/DL — SIGNIFICANT CHANGE UP (ref 8.4–10.5)
CALCIUM SERPL-MCNC: 9.4 MG/DL — SIGNIFICANT CHANGE UP (ref 8.4–10.5)
CHLORIDE SERPL-SCNC: 101 MMOL/L — SIGNIFICANT CHANGE UP (ref 96–108)
CHLORIDE SERPL-SCNC: 101 MMOL/L — SIGNIFICANT CHANGE UP (ref 96–108)
CO2 SERPL-SCNC: 23 MMOL/L — SIGNIFICANT CHANGE UP (ref 22–31)
CO2 SERPL-SCNC: 23 MMOL/L — SIGNIFICANT CHANGE UP (ref 22–31)
COLOR SPEC: YELLOW — SIGNIFICANT CHANGE UP
COLOR SPEC: YELLOW — SIGNIFICANT CHANGE UP
CREAT SERPL-MCNC: 0.5 MG/DL — SIGNIFICANT CHANGE UP (ref 0.5–1.3)
CREAT SERPL-MCNC: 0.5 MG/DL — SIGNIFICANT CHANGE UP (ref 0.5–1.3)
DIFF PNL FLD: NEGATIVE — SIGNIFICANT CHANGE UP
DIFF PNL FLD: NEGATIVE — SIGNIFICANT CHANGE UP
EGFR: 127 ML/MIN/1.73M2 — SIGNIFICANT CHANGE UP
EGFR: 127 ML/MIN/1.73M2 — SIGNIFICANT CHANGE UP
EOSINOPHIL # BLD AUTO: 0 K/UL — SIGNIFICANT CHANGE UP (ref 0–0.5)
EOSINOPHIL # BLD AUTO: 0 K/UL — SIGNIFICANT CHANGE UP (ref 0–0.5)
EOSINOPHIL NFR BLD AUTO: 0 % — SIGNIFICANT CHANGE UP (ref 0–6)
EOSINOPHIL NFR BLD AUTO: 0 % — SIGNIFICANT CHANGE UP (ref 0–6)
GLUCOSE SERPL-MCNC: 80 MG/DL — SIGNIFICANT CHANGE UP (ref 70–99)
GLUCOSE SERPL-MCNC: 80 MG/DL — SIGNIFICANT CHANGE UP (ref 70–99)
GLUCOSE UR QL: NEGATIVE MG/DL — SIGNIFICANT CHANGE UP
GLUCOSE UR QL: NEGATIVE MG/DL — SIGNIFICANT CHANGE UP
HCT VFR BLD CALC: 29 % — LOW (ref 34.5–45)
HCT VFR BLD CALC: 29 % — LOW (ref 34.5–45)
HGB BLD-MCNC: 7.6 G/DL — LOW (ref 11.5–15.5)
HGB BLD-MCNC: 7.6 G/DL — LOW (ref 11.5–15.5)
INR BLD: 1.12 RATIO — SIGNIFICANT CHANGE UP (ref 0.85–1.18)
INR BLD: 1.12 RATIO — SIGNIFICANT CHANGE UP (ref 0.85–1.18)
KETONES UR-MCNC: 15 MG/DL
KETONES UR-MCNC: 15 MG/DL
LEUKOCYTE ESTERASE UR-ACNC: NEGATIVE — SIGNIFICANT CHANGE UP
LEUKOCYTE ESTERASE UR-ACNC: NEGATIVE — SIGNIFICANT CHANGE UP
LYMPHOCYTES # BLD AUTO: 1.91 K/UL — SIGNIFICANT CHANGE UP (ref 1–3.3)
LYMPHOCYTES # BLD AUTO: 1.91 K/UL — SIGNIFICANT CHANGE UP (ref 1–3.3)
LYMPHOCYTES # BLD AUTO: 24 % — SIGNIFICANT CHANGE UP (ref 13–44)
LYMPHOCYTES # BLD AUTO: 24 % — SIGNIFICANT CHANGE UP (ref 13–44)
MCHC RBC-ENTMCNC: 16.5 PG — LOW (ref 27–34)
MCHC RBC-ENTMCNC: 16.5 PG — LOW (ref 27–34)
MCHC RBC-ENTMCNC: 26.2 GM/DL — LOW (ref 32–36)
MCHC RBC-ENTMCNC: 26.2 GM/DL — LOW (ref 32–36)
MCV RBC AUTO: 62.8 FL — LOW (ref 80–100)
MCV RBC AUTO: 62.8 FL — LOW (ref 80–100)
MONOCYTES # BLD AUTO: 1.19 K/UL — HIGH (ref 0–0.9)
MONOCYTES # BLD AUTO: 1.19 K/UL — HIGH (ref 0–0.9)
MONOCYTES NFR BLD AUTO: 15 % — HIGH (ref 2–14)
MONOCYTES NFR BLD AUTO: 15 % — HIGH (ref 2–14)
NEUTROPHILS # BLD AUTO: 4.78 K/UL — SIGNIFICANT CHANGE UP (ref 1.8–7.4)
NEUTROPHILS # BLD AUTO: 4.78 K/UL — SIGNIFICANT CHANGE UP (ref 1.8–7.4)
NEUTROPHILS NFR BLD AUTO: 60 % — SIGNIFICANT CHANGE UP (ref 43–77)
NEUTROPHILS NFR BLD AUTO: 60 % — SIGNIFICANT CHANGE UP (ref 43–77)
NITRITE UR-MCNC: NEGATIVE — SIGNIFICANT CHANGE UP
NITRITE UR-MCNC: NEGATIVE — SIGNIFICANT CHANGE UP
PH UR: 7 — SIGNIFICANT CHANGE UP (ref 5–8)
PH UR: 7 — SIGNIFICANT CHANGE UP (ref 5–8)
PLATELET # BLD AUTO: 486 K/UL — HIGH (ref 150–400)
PLATELET # BLD AUTO: 486 K/UL — HIGH (ref 150–400)
POTASSIUM SERPL-MCNC: 4.1 MMOL/L — SIGNIFICANT CHANGE UP (ref 3.5–5.3)
POTASSIUM SERPL-MCNC: 4.1 MMOL/L — SIGNIFICANT CHANGE UP (ref 3.5–5.3)
POTASSIUM SERPL-SCNC: 4.1 MMOL/L — SIGNIFICANT CHANGE UP (ref 3.5–5.3)
POTASSIUM SERPL-SCNC: 4.1 MMOL/L — SIGNIFICANT CHANGE UP (ref 3.5–5.3)
PROT SERPL-MCNC: 7.6 G/DL — SIGNIFICANT CHANGE UP (ref 6–8.3)
PROT SERPL-MCNC: 7.6 G/DL — SIGNIFICANT CHANGE UP (ref 6–8.3)
PROT UR-MCNC: NEGATIVE MG/DL — SIGNIFICANT CHANGE UP
PROT UR-MCNC: NEGATIVE MG/DL — SIGNIFICANT CHANGE UP
PROTHROM AB SERPL-ACNC: 11.7 SEC — SIGNIFICANT CHANGE UP (ref 9.5–13)
PROTHROM AB SERPL-ACNC: 11.7 SEC — SIGNIFICANT CHANGE UP (ref 9.5–13)
RBC # BLD: 4.62 M/UL — SIGNIFICANT CHANGE UP (ref 3.8–5.2)
RBC # BLD: 4.62 M/UL — SIGNIFICANT CHANGE UP (ref 3.8–5.2)
RBC # FLD: 21.3 % — HIGH (ref 10.3–14.5)
RBC # FLD: 21.3 % — HIGH (ref 10.3–14.5)
RH IG SCN BLD-IMP: POSITIVE — SIGNIFICANT CHANGE UP
RH IG SCN BLD-IMP: POSITIVE — SIGNIFICANT CHANGE UP
SODIUM SERPL-SCNC: 135 MMOL/L — SIGNIFICANT CHANGE UP (ref 135–145)
SODIUM SERPL-SCNC: 135 MMOL/L — SIGNIFICANT CHANGE UP (ref 135–145)
SP GR SPEC: 1.02 — SIGNIFICANT CHANGE UP (ref 1–1.03)
SP GR SPEC: 1.02 — SIGNIFICANT CHANGE UP (ref 1–1.03)
UROBILINOGEN FLD QL: 0.2 MG/DL — SIGNIFICANT CHANGE UP (ref 0.2–1)
UROBILINOGEN FLD QL: 0.2 MG/DL — SIGNIFICANT CHANGE UP (ref 0.2–1)
WBC # BLD: 7.96 K/UL — SIGNIFICANT CHANGE UP (ref 3.8–10.5)
WBC # BLD: 7.96 K/UL — SIGNIFICANT CHANGE UP (ref 3.8–10.5)
WBC # FLD AUTO: 7.96 K/UL — SIGNIFICANT CHANGE UP (ref 3.8–10.5)
WBC # FLD AUTO: 7.96 K/UL — SIGNIFICANT CHANGE UP (ref 3.8–10.5)

## 2023-12-28 PROCEDURE — 99223 1ST HOSP IP/OBS HIGH 75: CPT

## 2023-12-28 RX ORDER — MISOPROSTOL 200 UG/1
200 TABLET ORAL
Qty: 2 | Refills: 0 | Status: ACTIVE | COMMUNITY
Start: 2023-12-28 | End: 1900-01-01

## 2023-12-28 RX ORDER — ONDANSETRON 8 MG/1
4 TABLET, FILM COATED ORAL ONCE
Refills: 0 | Status: COMPLETED | OUTPATIENT
Start: 2023-12-28 | End: 2023-12-28

## 2023-12-28 RX ADMIN — ONDANSETRON 4 MILLIGRAM(S): 8 TABLET, FILM COATED ORAL at 17:28

## 2023-12-28 NOTE — ED ADULT NURSE NOTE - OBJECTIVE STATEMENT
Pt is a 33 yr old female with pmh of iron deficiency anemia coming from home at the request of her OB for low hgb. PT is scheduled for a TOP (9 weeks pregnant currently) tomorrow and upon outpatient labs had a hgb of 7. Pt last hgb on 12/18 was 7.3. Pt denies any symptoms associated with the anemia. Pt is a/ox 3- vitals stable.

## 2023-12-28 NOTE — ED CDU PROVIDER INITIAL DAY NOTE - CLINICAL SUMMARY MEDICAL DECISION MAKING FREE TEXT BOX
Attending Shanna Galvin: 34 yo female sent in for blood transfusion prior to procedure with gyn tomorrow. upon arrival pt hemodynanimcally stable. denies any black or bloody stools. placed in cdu for prbc Attending Shanna Galvin: 32 yo female sent in for blood transfusion prior to procedure with gyn tomorrow. upon arrival pt hemodynanimcally stable. denies any black or bloody stools. placed in cdu for prbc

## 2023-12-28 NOTE — ED PROVIDER NOTE - PHYSICAL EXAMINATION
Physical Exam:  Gen: NAD, AOx3, non-toxic appearing, able to ambulate without assistance  Head: NCAT  HEENT: EOMI, PEERLA, normal conjunctiva, tongue midline, oral mucosa moist  Lung: CTAB, no respiratory distress, no wheezes/rhonchi/rales B/L, speaking in full sentences  CV: RRR  Abd: soft, NT, ND, no guarding, no rigidity, no rebound tenderness, no CVA tenderness   MSK: no visible deformities, ROM normal in UE/LE, no back pain  Neuro: No focal sensory or motor deficits  Skin: Warm, well perfused, no rash, no leg swelling  Psych: normal affect, calm Physical Exam:  Gen: NAD, AOx3, non-toxic appearing, able to ambulate without assistance  Head: NCAT  HEENT: EOMI, PEERLA, normal conjunctiva, tongue midline, oral mucosa moist  Lung: CTAB, no respiratory distress, no wheezes/rhonchi/rales B/L, speaking in full sentences  CV: RRR  Abd: soft, NT, ND, no guarding, no rigidity, no rebound tenderness, no CVA tenderness   MSK: no visible deformities, ROM normal in UE/LE, no back pain  Neuro: No focal sensory or motor deficits  Skin: Warm, well perfused, no rash, no leg swelling  Psych: normal affect, calm  Attending Shanna Galvin: Gen: NAD, heent: atrauamtic, , mmm, op pink, neck; nttp, no nuchal rigidity, chest: nttp, no crepitus, cv: rrr, no murmurs, lungs: ctab, abd: soft, nontender, nondistended, no peritoneal signs, , no guarding, ext: wwp, neg homans, skin: no rash, neuro: awake and alert, following commands, speech clear, sensation and strength intact, no focal deficits

## 2023-12-28 NOTE — ED PROVIDER NOTE - RAPID ASSESSMENT
Patient noted to be only responsive at nursing home. Patient does have baseline dementia but can usually hold a conversation  · Likely due to infection.   · Mental Status continues to improve and seems almost close to baseline per daughter today  · CT head negative for acute pathology OB- Dr. hidalgo  33-year-old female history of iron deficiency anemia  estimated 9 weeks pregnant presents to the emergency department for the evaluation of anemia.  Patient is scheduled for an elective TOP tomorrow with her OB and on preoperative testing she was advised her hemoglobin was too low and she would require transfusion prior to the procedure. hgb outpatient 7, no prior hx of requiring transfusion.  Patient denies any heavy bleeding during this pregnancy or lower abdominal cramping.  She reports that she has not her usual state of health otherwise. has had TVUS confirmining IUP. OB- Dr. hidalgo  33-year-old female history of iron deficiency anemia  estimated 9 weeks pregnant presents to the emergency department for the evaluation of anemia.  Patient is scheduled for an elective TOP tomorrow with her OB and on preoperative testing she was advised her hemoglobin was too low and she would require transfusion prior to the procedure. hgb outpatient 7, no prior hx of requiring transfusion.  Patient denies any heavy bleeding during this pregnancy or lower abdominal cramping.  She reports that she has not her usual state of health otherwise. has had TVUS confirmining IUP.    recevied teams notification from OB Alayna Jenkins requesting 2 units rpbcs for surgical optimization tomorrow

## 2023-12-28 NOTE — ED CDU PROVIDER INITIAL DAY NOTE - OBJECTIVE STATEMENT
Patient is a 33-year-old female with past history of iron deficiency anemia who presents emergency department complaining of abnormal labs.  Patient states that she is going weeks pregnant and scheduled to get an elective termination of pregnancy tomorrow.  Patient was sent in by her OB/GYN for blood transfusion patient states her elective operation is tomorrow at 9 AM.  Patient denies any symptoms at this time.    In the ED VSS.  Labs remarkable for hgb of 7.6.  Plan to observe in the CDU, 2 unit prbcs and repeat cbc.

## 2023-12-28 NOTE — ED CDU PROVIDER INITIAL DAY NOTE - ATTENDING APP SHARED VISIT CONTRIBUTION OF CARE
Attending MD Galvin:  I have personally performed a face to face diagnostic evaluation on this patient.  I have reviewed the ACP note and agree with the history, exam, and plan of care, except as noted.

## 2023-12-28 NOTE — ED PROVIDER NOTE - ATTENDING CONTRIBUTION TO CARE
Attending MD Shanna Galvin:  I personally have seen and examined this patient.  Resident note reviewed and agree on plan of care and except where noted.  See HPI, PE, and MDM for details.

## 2023-12-28 NOTE — ED PROVIDER NOTE - CLINICAL SUMMARY MEDICAL DECISION MAKING FREE TEXT BOX
Patient presents emergency department for blood transfusion.  Patient is hemodynamically stable afebrile presentation.  Patient physical exam is unremarkable.  Will repeat patient's blood work to evaluate for any acute changes.  If patient requires transfusion we will give patient transfusion. Patient presents emergency department for blood transfusion.  Patient is hemodynamically stable afebrile presentation.  Patient physical exam is unremarkable.  Will repeat patient's blood work to evaluate for any acute changes.  If patient requires transfusion we will give patient transfusion.  Attending Shanna Galvin: 32 yo female ho iron deficiency anemia sent in for PRBC. pt has ho anemia. scheduled for proceudre for termination with GYN tomorrow who request PRBC prior to proceudre. pt not currently on iron. no black or bloody stools. upon arrival pt hemodynamically stable. abdomen soft and notnender. pt denies any bleeding. no trauma to abdomen. will check labs, iron, refer to hematology. transfuse

## 2023-12-28 NOTE — ED ADULT NURSE REASSESSMENT NOTE - NS ED NURSE REASSESS COMMENT FT1
1803 Pt tolerated blood transfusion well.  No reaction noted.  VSS. Will continue to monitor pt progress.

## 2023-12-28 NOTE — ED ADULT NURSE REASSESSMENT NOTE - NS ED NURSE REASSESS COMMENT FT1
1U PRBC given. Consent in chart. Risks and benefits explained to patient. Patient verbalized understanding of risks and benefits. Patient aware of possible side effects. Vital signs stable. Second RN at bedside for confirmation.

## 2023-12-28 NOTE — ED ADULT NURSE REASSESSMENT NOTE - NS ED NURSE REASSESS COMMENT FT1
1700 Report received from covering PITA Bauman ( [id number]) Pt AAOx4, NAD, resp nonlabored, skin warm/dry, resting comfortably in bed with family at bedside.. Pt denies headache, dizziness, chest pain, palpitations, SOB, abd pain, n/v/d, urinary symptoms, fevers, chills, weakness at this time. Pt awaiting for blood transfusion ( noted blood 112.cc left on the bag). Safety maintained with call bell within reach. 1700 Report received from covering PITA Bauman ( [id number]) Pt AAOx4, NAD, resp nonlabored, skin warm/dry, resting comfortably in bed with family at bedside.. Pt denies headache, dizziness, chest pain, palpitations, SOB, abd pain, n/v/d, urinary symptoms, fevers, chills, weakness at this time. Pt awaiting for blood transfusion ( noted blood 112.cc left on the bag). Safety maintained with call bell within reach.

## 2023-12-28 NOTE — ED ADULT NURSE NOTE - NS ED NURSE LEVEL OF CONSCIOUSNESS ORIENTATION
[place] speaking patient ([place]  Rep Sánchez ID#[id number])  POD#1 s/p . Seen and evaluated at bedside. No flatus, no BM, pain is currently 8/10. Tolerating PO. Minimal lochia  Denies headache, dizziness, chest pain, palpitations, shortness of breath, nausea, vomiting, heavy vaginal bleeding.  Pt is breastfeeding, ambulating without assistance, voiding spontaneously, and tolerating diet.    Plan for the day discussed, understanding assessed via teach back method, all questions answered.    MEDICATIONS  (STANDING):  lactated ringers. 1000 milliLiter(s) (125 mL/Hr) IV Continuous <Continuous>  lactated ringers. 1000 milliLiter(s) (125 mL/Hr) IV Continuous <Continuous>  oxytocin Infusion 333.333 milliUNIT(s)/Min (1000 mL/Hr) IV Continuous <Continuous>  oxytocin Infusion 41.667 milliUNIT(s)/Min (125 mL/Hr) IV Continuous <Continuous>  morphine PF Spinal 0.15 milliGRAM(s) IntraThecal. once  heparin  Injectable 5000 Unit(s) SubCutaneous every 12 hours    MEDICATIONS  (PRN):  metoclopramide Injectable 10 milliGRAM(s) IV Push once PRN Nausea and/or Vomiting  oxyCODONE    IR 5 milliGRAM(s) Oral every 3 hours PRN Mild Pain  oxyCODONE    IR 10 milliGRAM(s) Oral every 3 hours PRN Moderate Pain  HYDROmorphone  Injectable 1 milliGRAM(s) IV Push every 3 hours PRN Severe Pain  naloxone Injectable 0.1 milliGRAM(s) IV Push every 3 minutes PRN For ANY of the following changes in patient status:  A. RR LESS THAN 10 breaths per minute, B. Oxygen saturation LESS THAN 90%, C. Sedation score of 6  ondansetron Injectable 4 milliGRAM(s) IV Push every 6 hours PRN Nausea  guaiFENesin    Syrup 100 milliGRAM(s) Oral every 6 hours PRN Cough    REVIEW OF SYSTEMS:  CONSTITUTIONAL: No weakness, fevers or chills  EYES/ENT: No visual changes;  No vertigo or throat pain   NECK: No pain or stiffness  RESPIRATORY: No cough, wheezing, hemoptysis; No shortness of breath  CARDIOVASCULAR: No chest pain or palpitations  GASTROINTESTINAL: No abdominal or epigastric pain. No nausea, vomiting, or hematemesis;  GENITOURINARY: No dysuria, frequency or hematuria  NEUROLOGICAL: No numbness or weakness  SKIN: No itching, burning, rashes, or lesions   All other review of systems is negative unless indicated above    OBJECTIVE  Vital Signs Last 24 Hrs  T(C): 36.8 (17 Aug 2017 09:04), Max: 37.1 (17 Aug 2017 05:09)  T(F): 98.2 (17 Aug 2017 09:04), Max: 98.7 (17 Aug 2017 05:09)  HR: 86 (17 Aug 2017 09:04) (64 - 97)  BP: 111/67 (17 Aug 2017 09:04) (98/53 - 126/82)  RR: 16 (17 Aug 2017 09:04) (11 - 20)  SpO2: 98% (17 Aug 2017 09:04) (96% - 100%)    I&O's Summary    16 Aug 2017 07:  -  17 Aug 2017 07:00  --------------------------------------------------------  IN: 3300 mL / OUT: 2100 mL / NET: 1200 mL    17 Aug 2017 07:01  -  17 Aug 2017 11:24  --------------------------------------------------------  IN: 0 mL / OUT: 500 mL / NET: -500 mL    PHYSICAL EXAM:  Constitutional: NAD, awake and alert, well-developed  HEENT: PERR, EOMI, Normal Hearing, MMM  Neck: Soft and supple, No LAD, No JVD  Respiratory: Breath sounds are clear bilaterally, No wheezing, rales or rhonchi  Cardiovascular: S1 and S2, regular rate and rhythm  Gastrointestinal: soft, appropriately  tender, firm fundus palpated below umbilicus, nondistended. C/S scar is clean, dry, intact, nonerythematous  Extremities: No peripheral edema  Vascular: 2+ peripheral pulses  Neurological: A/O x 3, no focal deficits  Musculoskeletal: 5/5 strength b/l upper and lower extremities  Skin: No rashes    LABS: all labs assessed  Rubella immune  HIV AB nonreactive  RPR nonreactive  HepBsurface antigen negative    Type + Screen: A POS                        12.0   13.2  )-----------( 221      ( 16 Aug 2017 19:36 )             35.0                         13.1   8.5   )-----------( 235      ( 16 Aug 2017 08:36 )             37.9     ASSESSMENT    POD#1 s/p . Pt is stable, vitals and labs are stable.     PLAN  - Routine post op care  - Nurse PeriLinda informed of pt's pain level  - OOB and in chair today  - Encourage ambulation and po fluids  - Encourage breastfeeding  - f/u am labs  - Plan for d/c POD#3 if no complications Oriented - self; Oriented - place; Oriented - time

## 2023-12-28 NOTE — ED ADULT NURSE REASSESSMENT NOTE - NS ED NURSE REASSESS COMMENT FT1
1855 15 mins post blood transfusion patient states she feels no different than before blood started. No blood transfusion reactions. Denies chest pain, shortness of breath, dizziness, difficulty breathing, numbness, tingling. IV site with blood transfusion shows no redness, swelling at site. Patient stable with no complaints at this time.

## 2023-12-28 NOTE — ED PROVIDER NOTE - PROGRESS NOTE DETAILS
Attending Shanna Galvin: pt's ob requesting 2U prbc. prior to procedure. will place in cdu [place]- Spoke with OBGYN resident on call- confirmed request of 2u PRBC for procedure tomorrow.

## 2023-12-28 NOTE — ED ADULT NURSE REASSESSMENT NOTE - NS ED NURSE REASSESS COMMENT FT1
Pt received from MARCO Lara with second unit od blood transfusion in progress. Pt A&O x 4. Transfusion completed with out any side effects. Awaiting to draw post transfusion CBC. V/S stable, IV patent and free of signs of infiltration. Call bell in reach. Will continue to monitor.

## 2023-12-28 NOTE — ED ADULT NURSE REASSESSMENT NOTE - NS ED NURSE REASSESS COMMENT FT1
1840 PRBCs  given. Consent in chart. Risks and benefits explained to patient. Patient verbalized understanding of risks and benefits. Patient aware of possible side effects. Vital signs stable. Second RN at bedside for confirmation.

## 2023-12-28 NOTE — ED PROVIDER NOTE - OBJECTIVE STATEMENT
Patient is a 33-year-old female with past history of iron deficiency anemia who presents emergency department complaining of abnormal labs.  Patient states that she is going weeks pregnant and scheduled to get an elective termination of pregnancy tomorrow.  Patient was sent in by her OB/GYN for blood transfusion patient states her elective operation is tomorrow at 9 AM.  Patient denies any symptoms at this time.

## 2023-12-28 NOTE — ED CDU PROVIDER INITIAL DAY NOTE - PROGRESS NOTE DETAILS
Pt received at sign out, resting comfortably, VSS. PRBC transfusion running. Pt aware that post CBC transfusion needed before dc, agrees with plan. To f/u tomorrow with OB at her scheduled procedure. Randy Anderson PA-C

## 2023-12-28 NOTE — ED ADULT NURSE NOTE - NSFALLUNIVINTERV_ED_ALL_ED
Bed/Stretcher in lowest position, wheels locked, appropriate side rails in place/Call bell, personal items and telephone in reach/Instruct patient to call for assistance before getting out of bed/chair/stretcher/Non-slip footwear applied when patient is off stretcher/[place] to call system/Physically safe environment - no spills, clutter or unnecessary equipment/Purposeful proactive rounding/Room/bathroom lighting operational, light cord in reach Bed/Stretcher in lowest position, wheels locked, appropriate side rails in place/Call bell, personal items and telephone in reach/Instruct patient to call for assistance before getting out of bed/chair/stretcher/Non-slip footwear applied when patient is off stretcher/[place] to call system/Physically safe environment - no spills, clutter or unnecessary equipment/Purposeful proactive rounding/Room/bathroom lighting operational, light cord in reach

## 2023-12-28 NOTE — ED ADULT NURSE NOTE - ED STAT RN HANDOFF DETAILS 2
Report given to receiving change of shift PITA Cornell  patient is in no acute distress. Patient vital signs stable, plan of care explained.

## 2023-12-29 ENCOUNTER — RESULT REVIEW (OUTPATIENT)
Age: 33
End: 2023-12-29

## 2023-12-29 ENCOUNTER — APPOINTMENT (OUTPATIENT)
Dept: OBGYN | Facility: CLINIC | Age: 33
End: 2023-12-29

## 2023-12-29 ENCOUNTER — TRANSCRIPTION ENCOUNTER (OUTPATIENT)
Age: 33
End: 2023-12-29

## 2023-12-29 ENCOUNTER — OUTPATIENT (OUTPATIENT)
Dept: OUTPATIENT SERVICES | Facility: HOSPITAL | Age: 33
LOS: 1 days | End: 2023-12-29
Payer: MEDICAID

## 2023-12-29 VITALS
TEMPERATURE: 99 F | OXYGEN SATURATION: 98 % | HEART RATE: 74 BPM | RESPIRATION RATE: 18 BRPM | DIASTOLIC BLOOD PRESSURE: 62 MMHG | SYSTOLIC BLOOD PRESSURE: 105 MMHG

## 2023-12-29 VITALS
SYSTOLIC BLOOD PRESSURE: 105 MMHG | HEART RATE: 68 BPM | DIASTOLIC BLOOD PRESSURE: 53 MMHG | RESPIRATION RATE: 17 BRPM | OXYGEN SATURATION: 100 %

## 2023-12-29 VITALS
SYSTOLIC BLOOD PRESSURE: 110 MMHG | OXYGEN SATURATION: 99 % | HEART RATE: 67 BPM | RESPIRATION RATE: 16 BRPM | DIASTOLIC BLOOD PRESSURE: 74 MMHG | HEIGHT: 63 IN | WEIGHT: 158.07 LBS | TEMPERATURE: 97 F

## 2023-12-29 DIAGNOSIS — Z98.891 HISTORY OF UTERINE SCAR FROM PREVIOUS SURGERY: Chronic | ICD-10-CM

## 2023-12-29 DIAGNOSIS — Z33.2 ENCOUNTER FOR ELECTIVE TERMINATION OF PREGNANCY: ICD-10-CM

## 2023-12-29 DIAGNOSIS — Z98.890 OTHER SPECIFIED POSTPROCEDURAL STATES: Chronic | ICD-10-CM

## 2023-12-29 LAB
CULTURE RESULTS: SIGNIFICANT CHANGE UP
CULTURE RESULTS: SIGNIFICANT CHANGE UP
HCT VFR BLD CALC: 32.9 % — LOW (ref 34.5–45)
HCT VFR BLD CALC: 32.9 % — LOW (ref 34.5–45)
HGB BLD-MCNC: 9.4 G/DL — LOW (ref 11.5–15.5)
HGB BLD-MCNC: 9.4 G/DL — LOW (ref 11.5–15.5)
MCHC RBC-ENTMCNC: 18.5 PG — LOW (ref 27–34)
MCHC RBC-ENTMCNC: 18.5 PG — LOW (ref 27–34)
MCHC RBC-ENTMCNC: 28.6 GM/DL — LOW (ref 32–36)
MCHC RBC-ENTMCNC: 28.6 GM/DL — LOW (ref 32–36)
MCV RBC AUTO: 64.6 FL — LOW (ref 80–100)
MCV RBC AUTO: 64.6 FL — LOW (ref 80–100)
NRBC # BLD: 0 /100 WBCS — SIGNIFICANT CHANGE UP (ref 0–0)
NRBC # BLD: 0 /100 WBCS — SIGNIFICANT CHANGE UP (ref 0–0)
PLATELET # BLD AUTO: 416 K/UL — HIGH (ref 150–400)
PLATELET # BLD AUTO: 416 K/UL — HIGH (ref 150–400)
RBC # BLD: 5.09 M/UL — SIGNIFICANT CHANGE UP (ref 3.8–5.2)
RBC # BLD: 5.09 M/UL — SIGNIFICANT CHANGE UP (ref 3.8–5.2)
RBC # FLD: 22.7 % — HIGH (ref 10.3–14.5)
RBC # FLD: 22.7 % — HIGH (ref 10.3–14.5)
SPECIMEN SOURCE: SIGNIFICANT CHANGE UP
SPECIMEN SOURCE: SIGNIFICANT CHANGE UP
WBC # BLD: 8.54 K/UL — SIGNIFICANT CHANGE UP (ref 3.8–10.5)
WBC # BLD: 8.54 K/UL — SIGNIFICANT CHANGE UP (ref 3.8–10.5)
WBC # FLD AUTO: 8.54 K/UL — SIGNIFICANT CHANGE UP (ref 3.8–10.5)
WBC # FLD AUTO: 8.54 K/UL — SIGNIFICANT CHANGE UP (ref 3.8–10.5)

## 2023-12-29 PROCEDURE — 85025 COMPLETE CBC W/AUTO DIFF WBC: CPT

## 2023-12-29 PROCEDURE — 86850 RBC ANTIBODY SCREEN: CPT

## 2023-12-29 PROCEDURE — 85045 AUTOMATED RETICULOCYTE COUNT: CPT

## 2023-12-29 PROCEDURE — 99239 HOSP IP/OBS DSCHRG MGMT >30: CPT

## 2023-12-29 PROCEDURE — 86900 BLOOD TYPING SEROLOGIC ABO: CPT

## 2023-12-29 PROCEDURE — 36415 COLL VENOUS BLD VENIPUNCTURE: CPT

## 2023-12-29 PROCEDURE — 80053 COMPREHEN METABOLIC PANEL: CPT

## 2023-12-29 PROCEDURE — 88305 TISSUE EXAM BY PATHOLOGIST: CPT | Mod: 26

## 2023-12-29 PROCEDURE — 36430 TRANSFUSION BLD/BLD COMPNT: CPT

## 2023-12-29 PROCEDURE — 93005 ELECTROCARDIOGRAM TRACING: CPT

## 2023-12-29 PROCEDURE — 59840 INDUCED ABORTION D&C: CPT

## 2023-12-29 PROCEDURE — 76998 US GUIDE INTRAOP: CPT | Mod: 26

## 2023-12-29 PROCEDURE — 96374 THER/PROPH/DIAG INJ IV PUSH: CPT

## 2023-12-29 PROCEDURE — 81003 URINALYSIS AUTO W/O SCOPE: CPT

## 2023-12-29 PROCEDURE — 88305 TISSUE EXAM BY PATHOLOGIST: CPT

## 2023-12-29 PROCEDURE — 86923 COMPATIBILITY TEST ELECTRIC: CPT

## 2023-12-29 PROCEDURE — 85027 COMPLETE CBC AUTOMATED: CPT

## 2023-12-29 PROCEDURE — P9016: CPT

## 2023-12-29 PROCEDURE — 85730 THROMBOPLASTIN TIME PARTIAL: CPT

## 2023-12-29 PROCEDURE — 83615 LACTATE (LD) (LDH) ENZYME: CPT

## 2023-12-29 PROCEDURE — 99283 EMERGENCY DEPT VISIT LOW MDM: CPT | Mod: 25

## 2023-12-29 PROCEDURE — G0378: CPT

## 2023-12-29 PROCEDURE — 85610 PROTHROMBIN TIME: CPT

## 2023-12-29 PROCEDURE — 86901 BLOOD TYPING SEROLOGIC RH(D): CPT

## 2023-12-29 PROCEDURE — 83550 IRON BINDING TEST: CPT

## 2023-12-29 PROCEDURE — 83540 ASSAY OF IRON: CPT

## 2023-12-29 PROCEDURE — 87086 URINE CULTURE/COLONY COUNT: CPT

## 2023-12-29 RX ORDER — FERROUS SULFATE 325(65) MG
1 TABLET ORAL
Qty: 0 | Refills: 0 | DISCHARGE

## 2023-12-29 NOTE — ASU DISCHARGE PLAN (ADULT/PEDIATRIC) - CARE PROVIDER_API CALL
Violet Mckenzie  Obstetrics and Gynecology  [address], [address]  [place] [address]  Phone: [phone]  Fax: [phone]  Established Patient  Follow Up Time: 2 weeks   Violet Mckenzie  Obstetrics and Gynecology  [address], [address]  [place] [address]  Phone: [phone]  Fax: [phone]  Established Patient  Follow Up Time: 2 weeks

## 2023-12-29 NOTE — ED CDU PROVIDER DISPOSITION NOTE - ATTENDING APP SHARED VISIT CONTRIBUTION OF CARE
CDU Attending Note -- Pt seen and examined at bedside.  Case discussed c CDU PA.  Appreciate OB recs.  Appropriate increase in H/H post transfusion.  Pt has no exertional symptoms and VSS.  Offered her to stay in CDU pending procedure tomorrow but she declines, which is reasonable.  Comfortable, asymptomatic, and has good follow up tomorrow.  At this time there is no further work-up or treatment required to necessitate further CDU monitoring or admission.  Strict return precautions provided to patient.  Will discharge.  --BMM CDU Attending Note -- Pt seen and examined at bedside.  Case discussed c CDU PA.  Appreciate OB recs.  Appropriate increase in H/H post transfusion.  No reported exertional symptoms and VSS.  Offered her to stay in CDU pending procedure tomorrow but she declines, which is reasonable.  Stated to be comfortable, asymptomatic, and with good follow up tomorrow.  I attempted to evaluate patient prior to discharge but was required to respond to an emergency.  Upon attempted to assess the patient I learned she had become upset that she was waiting and left the CDU.  No obvious further work-up or treatment required to necessitate further CDU monitoring or admission based on labs and hospital course.  Strict return precautions provided to patient by CDU PA.  --FILOMENA CDU Attending Note -- Case discussed c CDU PA.  labs reviewed.  Appreciate OB recs.  Appropriate increase in H/H post transfusion.  No reported exertional symptoms and VSS.  Offered her to stay in CDU pending procedure tomorrow but she declines, which is reasonable.  Stated to be comfortable, asymptomatic, and with good follow up tomorrow.  I attempted to evaluate patient prior to discharge but was required to respond to an emergency.  Upon attempted to assess the patient I learned she had become upset that she was waiting and left the CDU.  No obvious further work-up or treatment required to necessitate further CDU monitoring or admission based on labs and hospital course.  Strict return precautions provided to patient by CDU PA.  --BMM

## 2023-12-29 NOTE — ED CDU PROVIDER SUBSEQUENT DAY NOTE - HISTORY
Patient seen at bedside in NAD.  VSS.  Patient resting comfortably without complaints. Post CBC transfusion with appropriate response to PRBCs. Pt feels well and would like to be discharged. Has appt tomorrow morning with Dr. Violet Mckenzie, for elective termination of pregnancy. Copy of results provided. Patient stable for discharge.  Follow up instructions given, return to ED precautions reviewed. Importance of follow up emphasized, patient verbalized understanding.  All questions answered. Case dw Dr. Watts. Randy Anderson PA-C Patient seen at bedside in NAD.  VSS.  Patient resting comfortably without complaints. Post transfusion CBC with appropriate response to PRBCs, H/H now 9.4/32.9. Pt feels well and would like to be discharged. Has appt tomorrow morning with Dr. Violet Mckenzie, for elective termination of pregnancy. Copy of results provided. Patient stable for discharge.  Follow up instructions given, return to ED precautions reviewed. Importance of follow up emphasized, patient verbalized understanding.  All questions answered. Case dw Dr. Watts. Randy Anderson PA-C

## 2023-12-29 NOTE — BRIEF OPERATIVE NOTE - OPERATION/FINDINGS
EUA w/ bulky, but mobile fibroid uterus, 22wks in size  Dilated to 33 Fr. Size 10 suction curette used  All fetal parts accounted for. No evidence of retained POCs seen on bedside sono (ultrasound visualization of stripe limited due to numerous fibroids) EUA w/ bulky, but mobile fibroid uterus, 22wks in size  Dilated to 33 Fr. Size 10 suction curette used. D&C performed under direct ultrasound guidance.  All fetal parts accounted for and appropriate for gestational age. No evidence of retained POCs seen on bedside sono (ultrasound visualization of stripe limited due to numerous fibroids)

## 2023-12-29 NOTE — BRIEF OPERATIVE NOTE - NSICDXBRIEFPOSTOP_GEN_ALL_CORE_FT
POST-OP DIAGNOSIS:  Fibroid uterus 29-Dec-2023 11:31:03  Tanner Goodson  Intrauterine pregnancy 29-Dec-2023 11:31:37  Tanner Goodson  Unplanned pregnancy 29-Dec-2023 11:31:44  Tanner Goodson

## 2023-12-29 NOTE — ASU DISCHARGE PLAN (ADULT/PEDIATRIC) - NURSING INSTRUCTIONS
Next dose of tylenol at/after_05pm__. Do not exceed 4000mg in a 24hour  period. Every 6hours as needed.

## 2023-12-29 NOTE — BRIEF OPERATIVE NOTE - NSICDXBRIEFPREOP_GEN_ALL_CORE_FT
PRE-OP DIAGNOSIS:  Fibroid uterus 29-Dec-2023 11:30:32  Tanner Goodson  Unplanned pregnancy 29-Dec-2023 11:30:38  Tanner Goodson  Intrauterine pregnancy 29-Dec-2023 11:30:53  Tanner Goodson

## 2023-12-29 NOTE — ASU PREOP CHECKLIST - ALLERGY BAND ON
no known allergies Doxepin Counseling:  Patient advised that the medication is sedating and not to drive a car after taking this medication. Patient informed of potential adverse effects including but not limited to dry mouth, urinary retention, and blurry vision.  The patient verbalized understanding of the proper use and possible adverse effects of doxepin.  All of the patient's questions and concerns were addressed.

## 2023-12-29 NOTE — ASU DISCHARGE PLAN (ADULT/PEDIATRIC) - NS MD DC FALL RISK RISK
For information on Fall & Injury Prevention, visit: [email or web address] OR  [email or web address] OR  https://www.cdc.gov/steadi/patient.html For information on Fall & Injury Prevention, visit: [email or web address] OR  [email or web address]://[email or web address]/[email or web address]-[email or web address]-injury OR  https://www.cdc.gov/steadi/patient.html

## 2023-12-29 NOTE — BRIEF OPERATIVE NOTE - NSICDXBRIEFPROCEDURE_GEN_ALL_CORE_FT
PROCEDURES:  Dilation and curettage, uterus, with ultrasound guidance 29-Dec-2023 11:30:16  Tanner Goodson  Exam under anesthesia, pelvic 29-Dec-2023 11:30:24  Tanner Goodson

## 2023-12-29 NOTE — ASU PATIENT PROFILE, ADULT - BLOOD AVOIDANCE/RESTRICTIONS, PROFILE
PT resting between cares, reports she is feeling better with the Bipap on. Pt remains awaiting lab draw by phlebotomy. Pt and daughter updated on POC, denies any further needs at this time. none

## 2023-12-29 NOTE — ED CDU PROVIDER DISPOSITION NOTE - PATIENT PORTAL LINK FT
You can access the FollowMyHealth Patient Portal offered by [facility] by registering at the following website: [email or web address]. By joining [facility]’s FollowMyHealth portal, you will also be able to view your health information using other applications (apps) compatible with our system. You can access the FollowMyHealth Patient Portal offered by [facility] by registering at the following website: [email or web address]. By joining [facility]’s FollowMyHealth portal, you will also be able to view your health information using other applications (apps) compatible with our system.

## 2023-12-29 NOTE — ASU DISCHARGE PLAN (ADULT/PEDIATRIC) - MODE OF TRANSPORTATION
Consent: The patient's consent was obtained including but not limited to risks of crusting, scabbing, blistering, scarring, darker or lighter pigmentary change, recurrence, incomplete removal and infection. [FreeTextEntry1] : 83 yo male with PMH of HTN, HLD, previous smoker, CHF presenting to the office for FU toe ulcer. He noticed he had an ulcer on the left 3rd toe for 6 weeks now. He was hospitalized for infection and discharged on oral abx which he finished on 12/19/19.\par \par Over the last few weeks he has been in a lot of pain. Early January he was given clindamycin for 7 days and pain continued so he was given a course of Levaquin 750mg for 10 days. No change in pain. \par  Detail Level: Detailed Duration Of Freeze Thaw-Cycle (Seconds): 0 Render Post-Care Instructions In Note?: yes Post-Care Instructions: I reviewed with the patient in detail post-care instructions. Patient is to wear sunprotection, and avoid picking at any of the treated lesions. Pt may apply Vaseline to crusted or scabbing areas. Ambulatory

## 2023-12-29 NOTE — ED CDU PROVIDER DISPOSITION NOTE - NSFOLLOWUPINSTRUCTIONS_ED_ALL_ED_FT
Please make sure to follow up with your primary care doctor within 1-2 days and with the OBGYN specialist at your appointment tomorrow morning. Bring a copy of all of your results with you to your follow up appointments.   Return to the ER as discussed if you develop any new or worsening symptoms.

## 2023-12-29 NOTE — ASU DISCHARGE PLAN (ADULT/PEDIATRIC) - PROVIDER TOKENS
PROVIDER:[TOKEN:[[id number]],FOLLOWUP:[2 weeks],ESTABLISHEDPATIENT:[T]] PROVIDER:[TOKEN:[[id number]],FOLLOWUP:[2 weeks],ESTABLISHEDPATIENT:[T]]

## 2023-12-29 NOTE — ED CDU PROVIDER DISPOSITION NOTE - CLINICAL COURSE
33-year-old female with past history of iron deficiency anemia who presents emergency department complaining of abnormal labs.  Patient states that she is going weeks pregnant and scheduled to get an elective termination of pregnancy tomorrow.  Patient was sent in by her OB/GYN for blood transfusion patient states her elective operation is tomorrow at 9 AM.  Patient denies any symptoms at this time.  In the ED VSS.  Labs remarkable for hgb of 7.6.  Plan to observe in the CDU, 2 unit prbcs and repeat cbc.   In the CDU pt with stable VS and no complaints. Received 2 units of PRBCs. Post transfusion CBC with appropriate response to PRBCs. Pt feels well and would like to be discharged. Has appt tomorrow morning with Dr. Violet Mckenzie, for elective termination of pregnancy. Patient stable for discharge.

## 2024-01-01 ENCOUNTER — EMERGENCY (EMERGENCY)
Facility: HOSPITAL | Age: 34
LOS: 1 days | Discharge: LEFT BEFORE DISCHARGE | End: 2024-01-01
Attending: STUDENT IN AN ORGANIZED HEALTH CARE EDUCATION/TRAINING PROGRAM
Payer: MEDICAID

## 2024-01-01 VITALS
HEIGHT: 63 IN | SYSTOLIC BLOOD PRESSURE: 132 MMHG | DIASTOLIC BLOOD PRESSURE: 77 MMHG | HEART RATE: 84 BPM | OXYGEN SATURATION: 100 % | RESPIRATION RATE: 19 BRPM | WEIGHT: 156.97 LBS | TEMPERATURE: 98 F

## 2024-01-01 DIAGNOSIS — Z98.890 OTHER SPECIFIED POSTPROCEDURAL STATES: Chronic | ICD-10-CM

## 2024-01-01 DIAGNOSIS — Z98.891 HISTORY OF UTERINE SCAR FROM PREVIOUS SURGERY: Chronic | ICD-10-CM

## 2024-01-01 LAB
ALBUMIN SERPL ELPH-MCNC: 3.8 G/DL — SIGNIFICANT CHANGE UP (ref 3.3–5)
ALBUMIN SERPL ELPH-MCNC: 3.8 G/DL — SIGNIFICANT CHANGE UP (ref 3.3–5)
ALP SERPL-CCNC: 55 U/L — SIGNIFICANT CHANGE UP (ref 40–120)
ALP SERPL-CCNC: 55 U/L — SIGNIFICANT CHANGE UP (ref 40–120)
ALT FLD-CCNC: 10 U/L — SIGNIFICANT CHANGE UP (ref 10–45)
ALT FLD-CCNC: 10 U/L — SIGNIFICANT CHANGE UP (ref 10–45)
ANION GAP SERPL CALC-SCNC: 12 MMOL/L — SIGNIFICANT CHANGE UP (ref 5–17)
ANION GAP SERPL CALC-SCNC: 12 MMOL/L — SIGNIFICANT CHANGE UP (ref 5–17)
AST SERPL-CCNC: 39 U/L — SIGNIFICANT CHANGE UP (ref 10–40)
AST SERPL-CCNC: 39 U/L — SIGNIFICANT CHANGE UP (ref 10–40)
BASE EXCESS BLDV CALC-SCNC: 0 MMOL/L — SIGNIFICANT CHANGE UP (ref -2–3)
BASE EXCESS BLDV CALC-SCNC: 0 MMOL/L — SIGNIFICANT CHANGE UP (ref -2–3)
BILIRUB SERPL-MCNC: 0.4 MG/DL — SIGNIFICANT CHANGE UP (ref 0.2–1.2)
BILIRUB SERPL-MCNC: 0.4 MG/DL — SIGNIFICANT CHANGE UP (ref 0.2–1.2)
BUN SERPL-MCNC: 9 MG/DL — SIGNIFICANT CHANGE UP (ref 7–23)
BUN SERPL-MCNC: 9 MG/DL — SIGNIFICANT CHANGE UP (ref 7–23)
CA-I SERPL-SCNC: 1.25 MMOL/L — SIGNIFICANT CHANGE UP (ref 1.15–1.33)
CA-I SERPL-SCNC: 1.25 MMOL/L — SIGNIFICANT CHANGE UP (ref 1.15–1.33)
CALCIUM SERPL-MCNC: 9.5 MG/DL — SIGNIFICANT CHANGE UP (ref 8.4–10.5)
CALCIUM SERPL-MCNC: 9.5 MG/DL — SIGNIFICANT CHANGE UP (ref 8.4–10.5)
CHLORIDE BLDV-SCNC: 105 MMOL/L — SIGNIFICANT CHANGE UP (ref 96–108)
CHLORIDE BLDV-SCNC: 105 MMOL/L — SIGNIFICANT CHANGE UP (ref 96–108)
CHLORIDE SERPL-SCNC: 104 MMOL/L — SIGNIFICANT CHANGE UP (ref 96–108)
CHLORIDE SERPL-SCNC: 104 MMOL/L — SIGNIFICANT CHANGE UP (ref 96–108)
CO2 BLDV-SCNC: 27 MMOL/L — HIGH (ref 22–26)
CO2 BLDV-SCNC: 27 MMOL/L — HIGH (ref 22–26)
CO2 SERPL-SCNC: 21 MMOL/L — LOW (ref 22–31)
CO2 SERPL-SCNC: 21 MMOL/L — LOW (ref 22–31)
CREAT SERPL-MCNC: 0.47 MG/DL — LOW (ref 0.5–1.3)
CREAT SERPL-MCNC: 0.47 MG/DL — LOW (ref 0.5–1.3)
EGFR: 128 ML/MIN/1.73M2 — SIGNIFICANT CHANGE UP
EGFR: 128 ML/MIN/1.73M2 — SIGNIFICANT CHANGE UP
GAS PNL BLDV: 136 MMOL/L — SIGNIFICANT CHANGE UP (ref 136–145)
GAS PNL BLDV: 136 MMOL/L — SIGNIFICANT CHANGE UP (ref 136–145)
GAS PNL BLDV: SIGNIFICANT CHANGE UP
GAS PNL BLDV: SIGNIFICANT CHANGE UP
GLUCOSE BLDV-MCNC: 130 MG/DL — HIGH (ref 70–99)
GLUCOSE BLDV-MCNC: 130 MG/DL — HIGH (ref 70–99)
GLUCOSE SERPL-MCNC: 143 MG/DL — HIGH (ref 70–99)
GLUCOSE SERPL-MCNC: 143 MG/DL — HIGH (ref 70–99)
HCG SERPL-ACNC: 3397 MIU/ML — HIGH
HCG SERPL-ACNC: 3397 MIU/ML — HIGH
HCO3 BLDV-SCNC: 25 MMOL/L — SIGNIFICANT CHANGE UP (ref 22–29)
HCO3 BLDV-SCNC: 25 MMOL/L — SIGNIFICANT CHANGE UP (ref 22–29)
HCT VFR BLD CALC: 32 % — LOW (ref 34.5–45)
HCT VFR BLD CALC: 32 % — LOW (ref 34.5–45)
HCT VFR BLDA CALC: 28 % — LOW (ref 34.5–46.5)
HCT VFR BLDA CALC: 28 % — LOW (ref 34.5–46.5)
HGB BLD CALC-MCNC: 9.3 G/DL — LOW (ref 11.7–16.1)
HGB BLD CALC-MCNC: 9.3 G/DL — LOW (ref 11.7–16.1)
HGB BLD-MCNC: 8.9 G/DL — LOW (ref 11.5–15.5)
HGB BLD-MCNC: 8.9 G/DL — LOW (ref 11.5–15.5)
LACTATE BLDV-MCNC: 2.2 MMOL/L — HIGH (ref 0.5–2)
LACTATE BLDV-MCNC: 2.2 MMOL/L — HIGH (ref 0.5–2)
MCHC RBC-ENTMCNC: 18.5 PG — LOW (ref 27–34)
MCHC RBC-ENTMCNC: 18.5 PG — LOW (ref 27–34)
MCHC RBC-ENTMCNC: 27.8 GM/DL — LOW (ref 32–36)
MCHC RBC-ENTMCNC: 27.8 GM/DL — LOW (ref 32–36)
MCV RBC AUTO: 66.4 FL — LOW (ref 80–100)
MCV RBC AUTO: 66.4 FL — LOW (ref 80–100)
PCO2 BLDV: 44 MMHG — HIGH (ref 39–42)
PCO2 BLDV: 44 MMHG — HIGH (ref 39–42)
PH BLDV: 7.37 — SIGNIFICANT CHANGE UP (ref 7.32–7.43)
PH BLDV: 7.37 — SIGNIFICANT CHANGE UP (ref 7.32–7.43)
PLATELET # BLD AUTO: 287 K/UL — SIGNIFICANT CHANGE UP (ref 150–400)
PLATELET # BLD AUTO: 287 K/UL — SIGNIFICANT CHANGE UP (ref 150–400)
PO2 BLDV: 28 MMHG — SIGNIFICANT CHANGE UP (ref 25–45)
PO2 BLDV: 28 MMHG — SIGNIFICANT CHANGE UP (ref 25–45)
POTASSIUM BLDV-SCNC: 4.1 MMOL/L — SIGNIFICANT CHANGE UP (ref 3.5–5.1)
POTASSIUM BLDV-SCNC: 4.1 MMOL/L — SIGNIFICANT CHANGE UP (ref 3.5–5.1)
POTASSIUM SERPL-MCNC: 4.4 MMOL/L — SIGNIFICANT CHANGE UP (ref 3.5–5.3)
POTASSIUM SERPL-MCNC: 4.4 MMOL/L — SIGNIFICANT CHANGE UP (ref 3.5–5.3)
POTASSIUM SERPL-SCNC: 4.4 MMOL/L — SIGNIFICANT CHANGE UP (ref 3.5–5.3)
POTASSIUM SERPL-SCNC: 4.4 MMOL/L — SIGNIFICANT CHANGE UP (ref 3.5–5.3)
PROT SERPL-MCNC: 7.3 G/DL — SIGNIFICANT CHANGE UP (ref 6–8.3)
PROT SERPL-MCNC: 7.3 G/DL — SIGNIFICANT CHANGE UP (ref 6–8.3)
RBC # BLD: 4.82 M/UL — SIGNIFICANT CHANGE UP (ref 3.8–5.2)
RBC # BLD: 4.82 M/UL — SIGNIFICANT CHANGE UP (ref 3.8–5.2)
RBC # FLD: 24.1 % — HIGH (ref 10.3–14.5)
RBC # FLD: 24.1 % — HIGH (ref 10.3–14.5)
SAO2 % BLDV: 47 % — LOW (ref 67–88)
SAO2 % BLDV: 47 % — LOW (ref 67–88)
SODIUM SERPL-SCNC: 137 MMOL/L — SIGNIFICANT CHANGE UP (ref 135–145)
SODIUM SERPL-SCNC: 137 MMOL/L — SIGNIFICANT CHANGE UP (ref 135–145)
WBC # BLD: 17.04 K/UL — HIGH (ref 3.8–10.5)
WBC # BLD: 17.04 K/UL — HIGH (ref 3.8–10.5)
WBC # FLD AUTO: 17.04 K/UL — HIGH (ref 3.8–10.5)
WBC # FLD AUTO: 17.04 K/UL — HIGH (ref 3.8–10.5)

## 2024-01-01 PROCEDURE — 76856 US EXAM PELVIC COMPLETE: CPT | Mod: 26

## 2024-01-01 PROCEDURE — 93975 VASCULAR STUDY: CPT | Mod: 26

## 2024-01-01 PROCEDURE — 99285 EMERGENCY DEPT VISIT HI MDM: CPT

## 2024-01-01 PROCEDURE — 76830 TRANSVAGINAL US NON-OB: CPT | Mod: 26

## 2024-01-01 RX ORDER — KETOROLAC TROMETHAMINE 30 MG/ML
15 SYRINGE (ML) INJECTION ONCE
Refills: 0 | Status: DISCONTINUED | OUTPATIENT
Start: 2024-01-01 | End: 2024-01-01

## 2024-01-01 RX ADMIN — Medication 15 MILLIGRAM(S): at 22:35

## 2024-01-01 NOTE — ED PROVIDER NOTE - OBJECTIVE STATEMENT
34-year-old female, no significant past medical history, presents to the ED today for abdominal pain.  States that she had a D&C on Friday and has been ever since.  She has taken Tylenol for it with no improvement of her symptoms.  She came in today because the pain was excruciating compared to the last few days.  She reports nausea but no vomiting.  No fever.  She reports vaginal bleeding since the procedure, but no foul-smelling discharge.  No shortness of breath, chest pain, diarrhea.  Of note, patient required blood transfusion last Thursday, states that she has chronic iron deficiency anemia.

## 2024-01-01 NOTE — ED ADULT NURSE NOTE - CHIEF COMPLAINT QUOTE
C/o lower abdominal pain s/p  (11-12wks pregnant) on Friday. Endorses vaginal bleeding, states changing pad every 2-3 hrs

## 2024-01-01 NOTE — ED PROVIDER NOTE - ATTENDING CONTRIBUTION TO CARE
I, Yifan Jhaveri, have performed a history and physical exam on this patient, and discussed their management with the resident. I have fully participated in the care of this patient. I agree with the history, physical exam, and plan as documented by the resident, unless reported as otherwise below:    35 yo F , s/p D&C 4 days ago at ~10 wks preg, presenting to ED for severe lower abd pain, present since procedure however worsening. Associated nausea and vaginal bleeding. Denies vaginal bleeding or change in vaginal discharge. Denies lightheadedness or SOB. On exam pt severely ttp in lower quadrants, L>R. Alert. Oriented x 3. Appearing in mild distress 2/2 to pain. CTAB. No conjunctival pallor. VSS in  triage. Concern for post-op complication, retained products, endometritis, other complication. Plan for CT and TVUS imaging, screening lab work, HCG, and OBGYN consultation in ED. Will provide symptomatic tx for pain. Pt agreeable to plan. To be reassessed shortly.     Please refer to progress notes/disposition for additional decision making in patient's care. I, Yifan Jhaveri, have performed a history and physical exam on this patient, and discussed their management with the resident. I have fully participated in the care of this patient. I agree with the history, physical exam, and plan as documented by the resident, unless reported as otherwise below:    33 yo F , s/p D&C 4 days ago at ~10 wks preg, presenting to ED for severe lower abd pain, present since procedure however worsening. Associated nausea and vaginal bleeding. Denies vaginal bleeding or change in vaginal discharge. Denies lightheadedness or SOB. On exam pt severely ttp in lower quadrants, L>R. Alert. Oriented x 3. Appearing in mild distress 2/2 to pain. CTAB. No conjunctival pallor. VSS in  triage. Concern for post-op complication, retained products, endometritis, other complication. Plan for CT and TVUS imaging, screening lab work, HCG, and OBGYN consultation in ED. Will provide symptomatic tx for pain. Pt agreeable to plan. To be reassessed shortly.     Please refer to progress notes/disposition for additional decision making in patient's care.

## 2024-01-01 NOTE — ED PROVIDER NOTE - PHYSICAL EXAMINATION
On exam, patient is well appearing, NAD  CARDIAC: regular rate rhythm, normal S1/S2  CHEST: CTA BL, no wheeze or crackles  ABDOMEN: normal BS, soft, moderated to severe tenderness in LLQ  EXTREMITY: no gross deformity, no edema, good perfusion   NEURO: grossly normal On exam, patient isvisibly uncomfortable due to pain  CARDIAC: regular rate rhythm, normal S1/S2  CHEST: CTA BL, no wheeze or crackles  ABDOMEN: normal BS, soft, moderated to severe tenderness in LLQ  EXTREMITY: no gross deformity, no edema, good perfusion   NEURO: grossly normal On exam, patient isvisibly uncomfortable due to pain  CARDIAC: regular rate rhythm, normal S1/S2  CHEST: CTA BL, no wheeze or crackles  ABDOMEN: normal BS, soft, moderate to severe tenderness in LLQ  EXTREMITY: no gross deformity, no edema, good perfusion   NEURO: grossly normal

## 2024-01-01 NOTE — ED ADULT NURSE NOTE - OBJECTIVE STATEMENT
34yF, A&Ox4, independent, PMH chronic iron deficiency anemia, presents to the ED c/o abdominal pain since friday. Pt was 11 weeks pregnant and had a D&C on Friday . Pt has been taking OTC meds with no improvement. Pt having worsening pain today and endorsing nausea. Pt has been having vaginal bleeding since the procedure, changing pad every 3 hours. Pt has not vomited and denies foul smelling discharge. Gross neuro intact, no difficulty speaking in complete sentences, pulses x 4, moving all extremities, abdomen soft nondistended, skin intact.

## 2024-01-01 NOTE — ED PROVIDER NOTE - NSFOLLOWUPINSTRUCTIONS_ED_ALL_ED_FT
You were seen in the emergency department today for abdominal pain. You left before your workup was complete. Please follow up with your OB/GYN as soon as possible.    You may take 500-1000 mg acetaminophen (Tylenol) every 6 hours, as needed for pain.  You may take 600 mg ibuprofen every 8 hours, with food, as needed for pain.  You can take Tylenol and ibuprofen at the same time.     PLEASE RETURN TO THE EMERGENCY DEPARTMENT if you experience worsening of your symptoms, fever, uncontrollable headache, loss of consciousness, chest pain, difficulty breathing, uncontrollable nausea/vomiting, weakness/numbness/tingling, foul discharge from your vaginal, excessive vaginal bleeding.

## 2024-01-01 NOTE — ED PROVIDER NOTE - CLINICAL SUMMARY MEDICAL DECISION MAKING FREE TEXT BOX
37 y/o female presenting with abdominal pain. S/p D&C on Friday. No fever. Reports nausea, no vomiting. No urinary symptoms. no vaginal discharge. + Vaginal bleeding since surgery. Vitals within normal limits.  Afebrile.  Physical exam remarkable for moderate to severe tenderness in the left lower quadrant and voluntary guarding. Concerns for endometritis given recent surgical procedure. Would also want to r/o other pathology, such as ovarian torsion. Will obtain transvaginal ultrasound. CT scan to r/o intrabdominal pathology, such as diverticulitis. Will also obtain screening labs. Pain control. Dispo pending results.

## 2024-01-01 NOTE — ED ADULT NURSE REASSESSMENT NOTE - NS ED NURSE REASSESS COMMENT FT1
received report from MARCO Olmedo @[id number]. pt currently at Ultrasound at time of handoff. currently pending pt's return to ED Red. received report from MARCO Olmedo @0622. pt currently at Ultrasound at time of handoff. currently pending pt's return to ED Red.

## 2024-01-01 NOTE — ED PROVIDER NOTE - PROGRESS NOTE DETAILS
Leydricah [place], DO (PGY1): OB consulted. Will come see pt in ED Leydricah [place], DO (PGY1): [place]. States that she feels better after Toradol. The pt is clinically sober, AA&Ox3, free from distracting injury. Throughout our interactions in the ED today, the pt has demonstrated concrete thinking/reasoning, has maintained an orderly/reasonable conversation, appears to have intact insight/judgment/reason and therefore in our opinion has capacity to make decisions.    Given the patient's presentation, we communicated our concern for any leftover tissue that might be in the uterus, which could cause infection. The pt verbalized an understanding of our worries, stating that she would follow up with OB/GYN outpatient tomorrow. We’ve told the patient that the ED evaluation is incomplete, and the need for more imaging. We explained that many troublesome conditions haven’t been r/o.   We have discussed the need for further ED w/u so we can get more information about the possibility of there being retained products of conception in the uterus.  We have discussed the range of possible dx, potential testing & tx options.  We’ve made  numerous efforts to prevent the pt from leaving AMA.  Our discussions included the potential outcomes of leaving AMA, including worsening of their condition, becoming permanently disabled/in pain/critically ill, or death.  Despite these efforts, we were unable to convince the pt to stay.  The pt is refusing any  further care and is leaving against medical advice. We have attempted to offer tx/rx/guidance for any dangerous conditions which are most likely and/or dangerous.  We have answered all questions and have implored the pt to return ASAP to complete the work up.     Patient eloped before signing AMA form.

## 2024-01-02 ENCOUNTER — NON-APPOINTMENT (OUTPATIENT)
Age: 34
End: 2024-01-02

## 2024-01-02 VITALS
RESPIRATION RATE: 18 BRPM | DIASTOLIC BLOOD PRESSURE: 78 MMHG | SYSTOLIC BLOOD PRESSURE: 126 MMHG | HEART RATE: 72 BPM | TEMPERATURE: 98 F | OXYGEN SATURATION: 99 %

## 2024-01-02 DIAGNOSIS — D72.829 ELEVATED WHITE BLOOD CELL COUNT, UNSPECIFIED: ICD-10-CM

## 2024-01-02 LAB
ANISOCYTOSIS BLD QL: SLIGHT — SIGNIFICANT CHANGE UP
ANISOCYTOSIS BLD QL: SLIGHT — SIGNIFICANT CHANGE UP
BASOPHILS # BLD AUTO: 0 K/UL — SIGNIFICANT CHANGE UP (ref 0–0.2)
BASOPHILS # BLD AUTO: 0 K/UL — SIGNIFICANT CHANGE UP (ref 0–0.2)
BASOPHILS NFR BLD AUTO: 0 % — SIGNIFICANT CHANGE UP (ref 0–2)
BASOPHILS NFR BLD AUTO: 0 % — SIGNIFICANT CHANGE UP (ref 0–2)
BLD GP AB SCN SERPL QL: NEGATIVE — SIGNIFICANT CHANGE UP
BLD GP AB SCN SERPL QL: NEGATIVE — SIGNIFICANT CHANGE UP
BURR CELLS BLD QL SMEAR: PRESENT — SIGNIFICANT CHANGE UP
BURR CELLS BLD QL SMEAR: PRESENT — SIGNIFICANT CHANGE UP
DACRYOCYTES BLD QL SMEAR: SLIGHT — SIGNIFICANT CHANGE UP
DACRYOCYTES BLD QL SMEAR: SLIGHT — SIGNIFICANT CHANGE UP
ELLIPTOCYTES BLD QL SMEAR: SLIGHT — SIGNIFICANT CHANGE UP
ELLIPTOCYTES BLD QL SMEAR: SLIGHT — SIGNIFICANT CHANGE UP
EOSINOPHIL # BLD AUTO: 0 K/UL — SIGNIFICANT CHANGE UP (ref 0–0.5)
EOSINOPHIL # BLD AUTO: 0 K/UL — SIGNIFICANT CHANGE UP (ref 0–0.5)
EOSINOPHIL NFR BLD AUTO: 0 % — SIGNIFICANT CHANGE UP (ref 0–6)
EOSINOPHIL NFR BLD AUTO: 0 % — SIGNIFICANT CHANGE UP (ref 0–6)
LYMPHOCYTES # BLD AUTO: 1.04 K/UL — SIGNIFICANT CHANGE UP (ref 1–3.3)
LYMPHOCYTES # BLD AUTO: 1.04 K/UL — SIGNIFICANT CHANGE UP (ref 1–3.3)
LYMPHOCYTES # BLD AUTO: 6.1 % — LOW (ref 13–44)
LYMPHOCYTES # BLD AUTO: 6.1 % — LOW (ref 13–44)
MANUAL SMEAR VERIFICATION: SIGNIFICANT CHANGE UP
MANUAL SMEAR VERIFICATION: SIGNIFICANT CHANGE UP
MICROCYTES BLD QL: SLIGHT — SIGNIFICANT CHANGE UP
MICROCYTES BLD QL: SLIGHT — SIGNIFICANT CHANGE UP
MONOCYTES # BLD AUTO: 0.29 K/UL — SIGNIFICANT CHANGE UP (ref 0–0.9)
MONOCYTES # BLD AUTO: 0.29 K/UL — SIGNIFICANT CHANGE UP (ref 0–0.9)
MONOCYTES NFR BLD AUTO: 1.7 % — LOW (ref 2–14)
MONOCYTES NFR BLD AUTO: 1.7 % — LOW (ref 2–14)
NEUTROPHILS # BLD AUTO: 15.71 K/UL — HIGH (ref 1.8–7.4)
NEUTROPHILS # BLD AUTO: 15.71 K/UL — HIGH (ref 1.8–7.4)
NEUTROPHILS NFR BLD AUTO: 92.2 % — HIGH (ref 43–77)
NEUTROPHILS NFR BLD AUTO: 92.2 % — HIGH (ref 43–77)
OVALOCYTES BLD QL SMEAR: SLIGHT — SIGNIFICANT CHANGE UP
OVALOCYTES BLD QL SMEAR: SLIGHT — SIGNIFICANT CHANGE UP
PLAT MORPH BLD: NORMAL — SIGNIFICANT CHANGE UP
PLAT MORPH BLD: NORMAL — SIGNIFICANT CHANGE UP
POIKILOCYTOSIS BLD QL AUTO: SLIGHT — SIGNIFICANT CHANGE UP
POIKILOCYTOSIS BLD QL AUTO: SLIGHT — SIGNIFICANT CHANGE UP
RBC BLD AUTO: ABNORMAL
RBC BLD AUTO: ABNORMAL
RH IG SCN BLD-IMP: POSITIVE — SIGNIFICANT CHANGE UP
RH IG SCN BLD-IMP: POSITIVE — SIGNIFICANT CHANGE UP
SCHISTOCYTES BLD QL AUTO: SLIGHT — SIGNIFICANT CHANGE UP
SCHISTOCYTES BLD QL AUTO: SLIGHT — SIGNIFICANT CHANGE UP
SPHEROCYTES BLD QL SMEAR: SLIGHT — SIGNIFICANT CHANGE UP
SPHEROCYTES BLD QL SMEAR: SLIGHT — SIGNIFICANT CHANGE UP
TARGETS BLD QL SMEAR: SLIGHT — SIGNIFICANT CHANGE UP
TARGETS BLD QL SMEAR: SLIGHT — SIGNIFICANT CHANGE UP

## 2024-01-02 PROCEDURE — 96374 THER/PROPH/DIAG INJ IV PUSH: CPT | Mod: XU

## 2024-01-02 PROCEDURE — 86850 RBC ANTIBODY SCREEN: CPT

## 2024-01-02 PROCEDURE — 86900 BLOOD TYPING SEROLOGIC ABO: CPT

## 2024-01-02 PROCEDURE — 84132 ASSAY OF SERUM POTASSIUM: CPT

## 2024-01-02 PROCEDURE — 74177 CT ABD & PELVIS W/CONTRAST: CPT | Mod: MA

## 2024-01-02 PROCEDURE — 76856 US EXAM PELVIC COMPLETE: CPT

## 2024-01-02 PROCEDURE — 82947 ASSAY GLUCOSE BLOOD QUANT: CPT

## 2024-01-02 PROCEDURE — 99285 EMERGENCY DEPT VISIT HI MDM: CPT | Mod: 25

## 2024-01-02 PROCEDURE — 96376 TX/PRO/DX INJ SAME DRUG ADON: CPT

## 2024-01-02 PROCEDURE — 80053 COMPREHEN METABOLIC PANEL: CPT

## 2024-01-02 PROCEDURE — 85014 HEMATOCRIT: CPT

## 2024-01-02 PROCEDURE — 85018 HEMOGLOBIN: CPT

## 2024-01-02 PROCEDURE — 83605 ASSAY OF LACTIC ACID: CPT

## 2024-01-02 PROCEDURE — 74177 CT ABD & PELVIS W/CONTRAST: CPT | Mod: 26,MA

## 2024-01-02 PROCEDURE — 84295 ASSAY OF SERUM SODIUM: CPT

## 2024-01-02 PROCEDURE — 76830 TRANSVAGINAL US NON-OB: CPT

## 2024-01-02 PROCEDURE — 82435 ASSAY OF BLOOD CHLORIDE: CPT

## 2024-01-02 PROCEDURE — 86901 BLOOD TYPING SEROLOGIC RH(D): CPT

## 2024-01-02 PROCEDURE — 93975 VASCULAR STUDY: CPT

## 2024-01-02 PROCEDURE — 85025 COMPLETE CBC W/AUTO DIFF WBC: CPT

## 2024-01-02 PROCEDURE — 82330 ASSAY OF CALCIUM: CPT

## 2024-01-02 PROCEDURE — 82803 BLOOD GASES ANY COMBINATION: CPT

## 2024-01-02 PROCEDURE — 84702 CHORIONIC GONADOTROPIN TEST: CPT

## 2024-01-02 RX ORDER — KETOROLAC TROMETHAMINE 30 MG/ML
15 SYRINGE (ML) INJECTION ONCE
Refills: 0 | Status: DISCONTINUED | OUTPATIENT
Start: 2024-01-02 | End: 2024-01-02

## 2024-01-02 RX ADMIN — Medication 15 MILLIGRAM(S): at 02:04

## 2024-01-02 NOTE — ED ADULT NURSE REASSESSMENT NOTE - NS ED NURSE REASSESS COMMENT FT1
on return from break at 0410, pt's stretcher found to be empty and MD states pt eloped. pt contacted by RN at phone number listed in chart ([phone]) and pt tells RN she eloped from ED because she has work in the morning and could not wait. pt states to RN that she removed her IV and placed it in the sharps container. Charge RN aware on return from break at 0410, pt's stretcher found to be empty and MD states pt eloped. pt contacted by RN at phone number listed in chart ([phone]) and pt tells RN she eloped from ED because she has work in the morning and could not wait. pt states to RN that she removed her IV and placed it in the sharps container. Charge RN aware

## 2024-01-02 NOTE — CONSULT NOTE ADULT - ASSESSMENT
INCOMPLETE NOTE 34y  LMP 10/14/23 POD#4 from D&C at 10w6d on  presents with abdominal pain. Pain markedly improved after administration of 2 doses of Toradol 15mg IVP. Mild leukocytosis noted, but patient afebrile without acute signs/symptoms of infection. Patient hemodynamically and clinically stable. Pain symptoms likely 2/2 suboptimal analgesic regimen, however retained POC/endometritis still to be ruled out.     Recommendations  - Labs, TVUS and CTAP reviewed in detail  - TVUS study performed to r/o ovarian torsion, but not to r/o retained POC, therefore, doppler/colorflow evaluation of endometrium suboptimal to determine concern for retained POC  - Recommend repeat TVUS with evaluation of endometrium for retained POC   - If no radiographic evidence of retained POC on repeat TVUS, patient stable for discharge home    d/w Dr. Delilah Enriquez PGY4 34y  LMP 10/14/23 POD#4 from D&C at 10w6d on  presents with abdominal pain. Pain markedly improved after administration of 2 doses of Toradol 15mg IVP. Mild leukocytosis noted, but patient afebrile without acute signs/symptoms of infection. Patient hemodynamically and clinically stable. Pain symptoms likely 2/2 suboptimal analgesic regimen, however retained POC/endometritis still to be ruled out.     Recommendations  - Labs, TVUS and CTAP reviewed in detail  - TVUS study performed to r/o ovarian torsion, but not to r/o retained POC, therefore, doppler/colorflow evaluation of endometrium suboptimal to determine concern for retained POC  - Recommend repeat TVUS with evaluation of endometrium for retained POC   - If no radiographic evidence of retained POC on repeat TVUS, patient stable for discharge home  - Patient was counseled that if she is discharged home, she is to call the office to schedule a follow-up appointment this week in the office, and to return to the ED if experiencing heavy vaginal bleeding (saturating >1 pad/hr for 2+ consecutive hours, abdominal pain that does not improve with standing Tylenol AND Motrin q6hr, or signs/symptoms of infection [fevers, chills, abnormal discharge])    d/w Dr. Delilah Enriquez PGY4      R4 Addendum  Received notification from ED provider that patient eloped prior to undergoing repeat TVUS. Dr. Mazariegos's office will call patient later today to arrange follow-up this week in the office.     d/w Dr. Delilah Enriquez PGY4

## 2024-01-02 NOTE — CONSULT NOTE ADULT - ATTENDING COMMENTS
I have discussed the case with the resident.  Recommend repeat imaging to fully evaluate endometrium to evaluate specifically for retained products of conception given leukocytosis.  Protocol performed by radiology did not evaluate for this, images limited.  Given 1 time beta hcg value and incomplete imaging, unable to rule out retained products of conception as source of pain.  Included in the differential diagnosis is pain secondary to fibroid, and the patient's pain did improve after toradol.    As patient left without creating at minimum an outpatient follow up plan, will have office contact her to schedule repeat CBC, hcg and ultrasound.     Adilene Mazariegos MD

## 2024-01-02 NOTE — CONSULT NOTE ADULT - SUBJECTIVE AND OBJECTIVE BOX
GYN Consult Note    INCOMPLETE NOTE    34y  LMP 10/14/23 POD#4 from D&C at 10w6d on  presents with abdominal pain.  Reports pain is sharp and "stretching", 10/10 in severity, and primarily on her L abdomen.  Reports pain started  night () and is constant.  States she has had no relief with Tylenol, but has been taking it regularly.  Tried Ibuprofen once last night, and was able to sleep, but is unsure if it relieved her pain.  Patient endorses vaginal bleeding since the procedure that is similar to her regular menses, has changed 2 pads (approx 60% saturated each) since arriving to ED (approx 4.5 hours ago).  Denies fevers, chills, nausea, vomiting, chest pain, shortness of breath, lightheadedness, dizziness.      While in the ED, patient received Toradol with improvement of pain, now states pain is 7/10 in severity, still primarily on the L side of her abdomen.      OB/GYN HISTORY:   Physician: Dr. Mckenzie  Ob:   - : D&C first trimester  - : FT C/S @42w for macrosomia  - : D&C @9w  - 23: D&C @10w6d - Per chart review, D&C was uncomplicated, exam at that time notable for bulky fibroid uterus, approx 22w size  Gyn: Known fibroids; Denies ovarian cysts, abnormal pap smears, STIs   PMH: Anemia - received 2uPRBC transfusion on 23 for preoperative optimization for d&c, has received iron infusions in the past  PSH: D&C x3, C/Sx1, Gluteal augmentation ()  Meds:  Liquid iron, Tylenol PRN  Allergies: NKDA        REVIEW OF SYSTEMS  General: denies fevers, chills, tiredness  Skin/Breast: denies breast pain  Respiratory and Thorax: denies shortness of breath, denies cough  Cardiovascular: denies chest pain and denies palpitations  Gastrointestinal: denies abdominal pain, nausea/ vomiting	  Genitourinary: denies dysuria, increased urinary frequency, urgency	  Constitutional, Cardiovascular, Respiratory, Gastrointestinal, Genitourinary, Musculoskeletal and Integumentary review of systems are normal except as noted. 	      Vital Signs Last 24 Hrs  T(C): 36.9 (2024 00:10), Max: 36.9 (2024 00:10)  T(F): 98.5 (2024 00:10), Max: 98.5 (2024 00:10)  HR: 66 (2024 00:10) (66 - 84)  BP: 110/69 (2024 00:10) (110/69 - 132/77)  BP(mean): --  RR: 18 (2024 00:10) (18 - 19)  SpO2: 99% (2024 00:10) (99% - 100%)    Parameters below as of 2024 00:10  Patient On (Oxygen Delivery Method): room air      PHYSICAL EXAM:   Gen: NAD, alert and oriented x 3  Cardiovascular: RRR  Respiratory: breathing comfortably on RA  Abd: soft, non tender, non-distended  Pelvic: closed/long, no CMT, Uterus: normal size, non tender  Adnexa: non tender, no palpable masses  Speculum Exam: No active bleeding from os.  Physiologic discharge.    Extremities: non-tender b/l, no edema   Skin: warm and well perfused  *Pelvic exam performed with chaperone present    LABS:                        8.9    17.04 )-----------( 287      ( 2024 22:52 )             32.0         137  |  104  |  9   ----------------------------<  143<H>  4.4   |  21<L>  |  0.47<L>    Ca    9.5      2024 22:52    TPro  7.3  /  Alb  3.8  /  TBili  0.4  /  DBili  x   /  AST  39  /  ALT  10  /  AlkPhos  55  -      Urinalysis Basic - ( 2024 22:52 )    Color: x / Appearance: x / SG: x / pH: x  Gluc: 143 mg/dL / Ketone: x  / Bili: x / Urobili: x   Blood: x / Protein: x / Nitrite: x   Leuk Esterase: x / RBC: x / WBC x   Sq Epi: x / Non Sq Epi: x / Bacteria: x        RADIOLOGY & ADDITIONAL STUDIES: GYN Consult Note    34y  LMP 10/14/23 POD#4 from D&C at 10w6d on  presents with abdominal pain.  Reports pain is sharp and "stretching", 10/10 in severity, and primarily on her L abdomen.  Reports pain started  night () and is constant.  States she has had no relief with Tylenol, but has been taking it regularly.  Tried Ibuprofen once last night, and was able to sleep, but is unsure if it relieved her pain.  Patient endorses vaginal bleeding since the procedure that is similar to her regular menses, has changed 2 pads (approx 60% saturated each) since arriving to ED (approx 4.5 hours ago).  Denies fevers, chills, nausea, vomiting, chest pain, shortness of breath, lightheadedness, dizziness.      While in the ED, patient received Toradol with improvement of pain, now states pain is 7/10 in severity, still primarily on the L side of her abdomen.      OB/GYN HISTORY:   Physician: Dr. Mckenzie  Ob:   - : D&C first trimester  - : FT C/S @42w for macrosomia  - : D&C @9w  - 23: D&C @10w6d - Per chart review, D&C was uncomplicated, exam at that time notable for bulky fibroid uterus, approx 22w size  Gyn: Known fibroids; Denies ovarian cysts, abnormal pap smears, STIs   PMH: Anemia - received 2uPRBC transfusion on 23 for preoperative optimization for d&c, has received iron infusions in the past  PSH: D&C x3, C/Sx1, Gluteal augmentation ()  Meds:  Liquid iron, Tylenol PRN  Allergies: NKDA        REVIEW OF SYSTEMS  General: denies fevers, chills, tiredness  Skin/Breast: denies breast pain  Respiratory and Thorax: denies shortness of breath, denies cough  Cardiovascular: denies chest pain and denies palpitations  Gastrointestinal: denies abdominal pain, nausea/ vomiting	  Genitourinary: denies dysuria, increased urinary frequency, urgency	  Constitutional, Cardiovascular, Respiratory, Gastrointestinal, Genitourinary, Musculoskeletal and Integumentary review of systems are normal except as noted. 	      Vital Signs Last 24 Hrs  T(C): 36.9 (2024 00:10), Max: 36.9 (2024 00:10)  T(F): 98.5 (2024 00:10), Max: 98.5 (2024 00:10)  HR: 66 (2024 00:10) (66 - 84)  BP: 110/69 (2024 00:10) (110/69 - 132/77)  BP(mean): --  RR: 18 (2024 00:10) (18 - 19)  SpO2: 99% (2024 00:10) (99% - 100%)    Parameters below as of 2024 00:10  Patient On (Oxygen Delivery Method): room air      PHYSICAL EXAM:   Gen: NAD, alert and oriented x 3  Cardiovascular: RRR  Respiratory: breathing comfortably on RA  Abd: soft, non tender, non-distended  Pelvic: closed/long, no CMT, Uterus: normal size, non tender  Adnexa: non tender, no palpable masses  Speculum Exam: No active bleeding from os.  Physiologic discharge.    Extremities: non-tender b/l, no edema   Skin: warm and well perfused  *Pelvic exam performed with chaperone present    LABS:                        8.9    17.04 )-----------( 287      ( 2024 22:52 )             32.0         137  |  104  |  9   ----------------------------<  143<H>  4.4   |  21<L>  |  0.47<L>    Ca    9.5      2024 22:52    TPro  7.3  /  Alb  3.8  /  TBili  0.4  /  DBili  x   /  AST  39  /  ALT  10  /  AlkPhos  55  -      Urinalysis Basic - ( 2024 22:52 )    Color: x / Appearance: x / SG: x / pH: x  Gluc: 143 mg/dL / Ketone: x  / Bili: x / Urobili: x   Blood: x / Protein: x / Nitrite: x   Leuk Esterase: x / RBC: x / WBC x   Sq Epi: x / Non Sq Epi: x / Bacteria: x        RADIOLOGY & ADDITIONAL STUDIES:  CTAP FINDINGS:  LOWERCHEST: Within normal limits.    LIVER: Within normal limits.  BILE DUCTS: Normal caliber.  GALLBLADDER: Within normal limits.  SPLEEN: Within normal limits.  PANCREAS: Within normal limits.  ADRENALS: Within normal limits.  KIDNEYS/URETERS: Within normal limits.    BLADDER: Underdistended, compressed by uterus limiting evaluation.  REPRODUCTIVE ORGANS: Markedly enlarged myomatous uterus.. This measures approximately 12.7 x 10.5 x 20.5 cm.    BOWEL: No bowel obstruction. Appendix is normal.  PERITONEUM: No ascites.  VESSELS: Within normal limits.  RETROPERITONEUM/LYMPH NODES: No lymphadenopathy.  ABDOMINAL WALL: Within normal limits.  BONES: Within normal limits.    IMPRESSION:  Markedly enlarged myomatous uterus.      TVUS FINDINGS:  *** ADDENDUM # 1 ***  There is no evidence for retained products of conception however this exam is limited in that the entire endometrium is not clearly seen due to the numerous fibroids.    Additionally there is a transcription error under the heading of comparison which should say,  COMPARISON: CT abdomen and pelvis from the same day 2024.  *** END OF ADDENDUM # 1 ***    PROCEDURE DATE:  2024      INTERPRETATION:  CLINICAL INFORMATION: Pelvic pain question endometriosis or torsion.    LMP: 10/14/2023    COMPARISON: CT abdomen pelvis from the same day 124    TECHNIQUE:  Endovaginal and transabdominal pelvic sonogram. Color and Spectral Doppler was performed.    FINDINGS:  Uterus: 20.0 x 9.0 x 0.8 cm. Heterogeneous with numerous fibroids the largest is in the fundus and measures at least 10.1 cm. Subcentimeter cervical fibroid versus complex nabothian cyst.    Endometrium: 16 mm. Partially imaged obscured by fibroids.    Right ovary: The right ovary could not be visualized.  Left ovary: 3.3 x 1.3 x 2.4 cm. Within normal limits. Normal arterial and venous waveforms.    Fluid: None.    IMPRESSION:  Markedly enlarged myomatous uterus. Limited evaluation on this ultrasound exam. This would be better evaluated with contrast-enhanced pelvic MRI as clinically indicated.  Nonvisualization of the right ovary. Normal left ovary.    ***Please see the addendum at the top of this report. It may contain additional important information or changes.**** GYN Consult Note    34y  LMP 10/14/23 POD#4 from D&C at 10w6d on  presents with abdominal pain.  Reports pain is sharp and "stretching", 10/10 in severity, and primarily on her L abdomen.  Reports pain started  night () and is constant.  States she has had no relief with Tylenol, but has been taking it regularly.  Tried Ibuprofen once last night, and was able to sleep, but is unsure if it relieved her pain.  Patient endorses vaginal bleeding since the procedure that is similar to her regular menses, has changed 2 pads (approx 60% saturated each) since arriving to ED (approx 4.5 hours ago).  Patient has been voiding spontaneously, passing flatus, +BM.  Denies fevers, chills, nausea, vomiting, chest pain, shortness of breath, lightheadedness, dizziness.      While in the ED, patient received Toradol with improvement of pain, now states pain is 7/10 in severity, still primarily on the L side of her abdomen.      OB/GYN HISTORY:   Physician: Dr. Mckenzie  Ob:   - 2007: D&C first trimester  - : FT C/S @42w for macrosomia  - : D&C @9w  - 23: D&C @10w6d - Per chart review, D&C was uncomplicated, exam at that time notable for bulky fibroid uterus, approx 22w size  Gyn: Known fibroids; Denies ovarian cysts, abnormal pap smears, STIs   PMH: Anemia - received 2uPRBC transfusion on 23 for preoperative optimization for d&c, has received iron infusions in the past  PSH: D&C x3, C/Sx1, Gluteal augmentation ()  Meds:  Liquid iron, Tylenol PRN  Allergies: NKDA        Vital Signs Last 24 Hrs  T(C): 36.9 (2024 00:10), Max: 36.9 (2024 00:10)  T(F): 98.5 (2024 00:10), Max: 98.5 (2024 00:10)  HR: 66 (2024 00:10) (66 - 84)  BP: 110/69 (2024 00:10) (110/69 - 132/77)  BP(mean): --  RR: 18 (2024 00:10) (18 - 19)  SpO2: 99% (2024 00:10) (99% - 100%)    Parameters below as of 2024 00:10  Patient On (Oxygen Delivery Method): room air      PHYSICAL EXAM:   Gen: NAD, alert and oriented x 3  Cardiovascular: RRR  Respiratory: breathing comfortably on RA  Abd: soft, non-distended, palpable bulky uterus with fibroids umbilicus, tenderness to palpation at L side, no rebound/guarding, no CVA tenderness  Pelvic: 0.5cm dilated/long,  no CMT, Uterus: bulky fibroid uterus ~20w size, mildly tendeer to palpation along L lateral side of uterus below umbilicus  Adnexa: non tender, no palpable masses  Speculum Exam: Small clot at os with small amount of trickling.   Extremities: non-tender b/l, no edema   Skin: warm and well perfused  *Pelvic exam performed with chaperone present: ED Tech Rosa    LABS:                        8.9    17.04 )-----------( 287      ( 2024 22:52 )             32.0         137  |  104  |  9   ----------------------------<  143<H>  4.4   |  21<L>  |  0.47<L>    Ca    9.5      2024 22:52    TPro  7.3  /  Alb  3.8  /  TBili  0.4  /  DBili  x   /  AST  39  /  ALT  10  /  AlkPhos  55        Urinalysis Basic - ( 2024 22:52 )    Color: x / Appearance: x / SG: x / pH: x  Gluc: 143 mg/dL / Ketone: x  / Bili: x / Urobili: x   Blood: x / Protein: x / Nitrite: x   Leuk Esterase: x / RBC: x / WBC x   Sq Epi: x / Non Sq Epi: x / Bacteria: x        RADIOLOGY & ADDITIONAL STUDIES:  CTAP FINDINGS:  LOWERCHEST: Within normal limits.    LIVER: Within normal limits.  BILE DUCTS: Normal caliber.  GALLBLADDER: Within normal limits.  SPLEEN: Within normal limits.  PANCREAS: Within normal limits.  ADRENALS: Within normal limits.  KIDNEYS/URETERS: Within normal limits.    BLADDER: Underdistended, compressed by uterus limiting evaluation.  REPRODUCTIVE ORGANS: Markedly enlarged myomatous uterus.. This measures approximately 12.7 x 10.5 x 20.5 cm.    BOWEL: No bowel obstruction. Appendix is normal.  PERITONEUM: No ascites.  VESSELS: Within normal limits.  RETROPERITONEUM/LYMPH NODES: No lymphadenopathy.  ABDOMINAL WALL: Within normal limits.  BONES: Within normal limits.    IMPRESSION:  Markedly enlarged myomatous uterus.      TVUS FINDINGS:  *** ADDENDUM # 1 ***  There is no evidence for retained products of conception however this exam is limited in that the entire endometrium is not clearly seen due to the numerous fibroids.    Additionally there is a transcription error under the heading of comparison which should say,  COMPARISON: CT abdomen and pelvis from the same day 2024.  *** END OF ADDENDUM # 1 ***    PROCEDURE DATE:  2024      INTERPRETATION:  CLINICAL INFORMATION: Pelvic pain question endometriosis or torsion.    LMP: 10/14/2023    COMPARISON: CT abdomen pelvis from the same day 124    TECHNIQUE:  Endovaginal and transabdominal pelvic sonogram. Color and Spectral Doppler was performed.    FINDINGS:  Uterus: 20.0 x 9.0 x 0.8 cm. Heterogeneous with numerous fibroids the largest is in the fundus and measures at least 10.1 cm. Subcentimeter cervical fibroid versus complex nabothian cyst.    Endometrium: 16 mm. Partially imaged obscured by fibroids.    Right ovary: The right ovary could not be visualized.  Left ovary: 3.3 x 1.3 x 2.4 cm. Within normal limits. Normal arterial and venous waveforms.    Fluid: None.    IMPRESSION:  Markedly enlarged myomatous uterus. Limited evaluation on this ultrasound exam. This would be better evaluated with contrast-enhanced pelvic MRI as clinically indicated.  Nonvisualization of the right ovary. Normal left ovary.    ***Please see the addendum at the top of this report. It may contain additional important information or changes.****

## 2024-01-04 LAB
SURGICAL PATHOLOGY STUDY: SIGNIFICANT CHANGE UP
SURGICAL PATHOLOGY STUDY: SIGNIFICANT CHANGE UP

## 2024-01-11 ENCOUNTER — APPOINTMENT (OUTPATIENT)
Dept: OBGYN | Facility: CLINIC | Age: 34
End: 2024-01-11

## 2024-01-11 ENCOUNTER — APPOINTMENT (OUTPATIENT)
Dept: OBGYN | Facility: CLINIC | Age: 34
End: 2024-01-11
Payer: MEDICAID

## 2024-01-11 ENCOUNTER — LABORATORY RESULT (OUTPATIENT)
Age: 34
End: 2024-01-11

## 2024-01-11 VITALS — SYSTOLIC BLOOD PRESSURE: 120 MMHG | RESPIRATION RATE: 22 BRPM | DIASTOLIC BLOOD PRESSURE: 70 MMHG | HEART RATE: 80 BPM

## 2024-01-11 DIAGNOSIS — Z30.09 ENCOUNTER FOR OTHER GENERAL COUNSELING AND ADVICE ON CONTRACEPTION: ICD-10-CM

## 2024-01-11 DIAGNOSIS — Z09 ENCOUNTER FOR FOLLOW-UP EXAMINATION AFTER COMPLETED TREATMENT FOR CONDITIONS OTHER THAN MALIGNANT NEOPLASM: ICD-10-CM

## 2024-01-11 PROCEDURE — 99213 OFFICE O/P EST LOW 20 MIN: CPT

## 2024-01-11 RX ORDER — NORETHINDRONE ACETATE AND ETHINYL ESTRADIOL AND FERROUS FUMARATE 1.5-30(21)
1.5-3 KIT ORAL DAILY
Qty: 30 | Refills: 6 | Status: ACTIVE | COMMUNITY
Start: 2024-01-11 | End: 1900-01-01

## 2024-01-12 LAB
BASOPHILS # BLD AUTO: 0.17 K/UL
BASOPHILS NFR BLD AUTO: 2.6 %
EOSINOPHIL # BLD AUTO: 0 K/UL
EOSINOPHIL NFR BLD AUTO: 0 %
HCT VFR BLD CALC: 33.3 %
HGB BLD-MCNC: 9.2 G/DL
LYMPHOCYTES # BLD AUTO: 2.23 K/UL
LYMPHOCYTES NFR BLD AUTO: 33.6 %
MAN DIFF?: NORMAL
MCHC RBC-ENTMCNC: 19 PG
MCHC RBC-ENTMCNC: 27.6 GM/DL
MCV RBC AUTO: 68.7 FL
MONOCYTES # BLD AUTO: 0.46 K/UL
MONOCYTES NFR BLD AUTO: 6.9 %
NEUTROPHILS # BLD AUTO: 3.78 K/UL
NEUTROPHILS NFR BLD AUTO: 56.9 %
PLATELET # BLD AUTO: 487 K/UL
RBC # BLD: 4.85 M/UL
RBC # FLD: 24.9 %
WBC # FLD AUTO: 6.65 K/UL

## 2025-05-15 NOTE — ED ADULT NURSE NOTE - NS TRANSFER PATIENT BELONGINGS
CT done by Dr. García has multiple pulmonary findings, though NONE look suspicious for cancer, which is great news. I would recommend he return to see Dr. Triplett for an opinion and to see if any additional testing is needed.  
Dr. Drummond note conveyed. Pt verbalized understanding without further questions.     Dr Triplett's office- FYI/Pt states will call  
Clothing/Cell Phone/PDA (specify)

## (undated) DEVICE — VACUUM CURETTE MEDGYN CURVED 13MM

## (undated) DEVICE — VACUUM CURETTE BUSSE HOSP CURVED 12MM

## (undated) DEVICE — VACUUM CURETTE BUSSE HOSP CURVED 9MM

## (undated) DEVICE — VACUUM CURETTE BERKLEY OLYMPUS F TIP 6MM

## (undated) DEVICE — VACUUM CURETTE BERKLEY OLYMPUS CURVED 7MM

## (undated) DEVICE — VACUUM CURETTE BUSSE HOSP CURVED 11MM

## (undated) DEVICE — GOWN TRIMAX LG

## (undated) DEVICE — TUBING UTERINE ASPIRATION 3/8" X 6FT W/O ADAPTER

## (undated) DEVICE — PACK LITHOTOMY

## (undated) DEVICE — DRAPE LIGHT HANDLE COVER (GREEN)

## (undated) DEVICE — VACUUM CURETTE BERKLEY OLYMPUS CURVED 16MM X 1/2"

## (undated) DEVICE — VACUUM CURETTE BERKLEY OLYMPUS CURVED 9MM

## (undated) DEVICE — GLV 6.5 PROTEXIS (WHITE)

## (undated) DEVICE — VACUUM CURETTE BUSSE HOSP CURVED 10MM

## (undated) DEVICE — SOL IRR POUR NS 0.9% 500ML

## (undated) DEVICE — VACUUM CURETTE BUSSE HOSP STRAIGHT 7MM

## (undated) DEVICE — VACUUM CURETTE BUSSE HOSP CURVED 14MM

## (undated) DEVICE — WARMING BLANKET UPPER ADULT

## (undated) DEVICE — VACUUM CURETTE BERKLEY OLYMPUS CURVED 8MM

## (undated) DEVICE — DRAPE 1/2 SHEET 40X57"

## (undated) DEVICE — VACUUM CURETTE BERKLEY OLYMPUS CURVED 12MM

## (undated) DEVICE — VACUUM CURETTE BERKLEY OLYMPUS CURVED 11MM

## (undated) DEVICE — SOCK SPECIMEN 3/8"-1/2" MALE PORT